# Patient Record
Sex: MALE | Race: WHITE | NOT HISPANIC OR LATINO | Employment: FULL TIME | URBAN - NONMETROPOLITAN AREA
[De-identification: names, ages, dates, MRNs, and addresses within clinical notes are randomized per-mention and may not be internally consistent; named-entity substitution may affect disease eponyms.]

---

## 2019-01-10 ENCOUNTER — OFFICE VISIT (OUTPATIENT)
Dept: FAMILY MEDICINE CLINIC | Facility: CLINIC | Age: 49
End: 2019-01-10

## 2019-01-10 ENCOUNTER — RESULTS ENCOUNTER (OUTPATIENT)
Dept: FAMILY MEDICINE CLINIC | Facility: CLINIC | Age: 49
End: 2019-01-10

## 2019-01-10 VITALS
BODY MASS INDEX: 28.36 KG/M2 | HEART RATE: 86 BPM | TEMPERATURE: 98.4 F | SYSTOLIC BLOOD PRESSURE: 126 MMHG | HEIGHT: 74 IN | WEIGHT: 221 LBS | DIASTOLIC BLOOD PRESSURE: 83 MMHG | OXYGEN SATURATION: 99 %

## 2019-01-10 DIAGNOSIS — R53.83 OTHER FATIGUE: ICD-10-CM

## 2019-01-10 DIAGNOSIS — Z12.5 ENCOUNTER FOR SCREENING FOR MALIGNANT NEOPLASM OF PROSTATE: ICD-10-CM

## 2019-01-10 DIAGNOSIS — Z87.898 HISTORY OF ULCER DISEASE: ICD-10-CM

## 2019-01-10 DIAGNOSIS — R53.83 OTHER FATIGUE: Primary | ICD-10-CM

## 2019-01-10 DIAGNOSIS — J30.89 NON-SEASONAL ALLERGIC RHINITIS DUE TO OTHER ALLERGIC TRIGGER: ICD-10-CM

## 2019-01-10 PROCEDURE — 99204 OFFICE O/P NEW MOD 45 MIN: CPT | Performed by: FAMILY MEDICINE

## 2019-01-10 RX ORDER — MONTELUKAST SODIUM 10 MG/1
10 TABLET ORAL NIGHTLY
Qty: 30 TABLET | Refills: 5 | Status: SHIPPED | OUTPATIENT
Start: 2019-01-10 | End: 2020-08-21

## 2019-01-10 RX ORDER — PANTOPRAZOLE SODIUM 40 MG/1
20 TABLET, DELAYED RELEASE ORAL DAILY
Refills: 5 | COMMUNITY
Start: 2018-11-27 | End: 2020-08-10 | Stop reason: SDUPTHER

## 2019-01-10 NOTE — PATIENT INSTRUCTIONS
Try the singulair.  Use your neti pot.    May have to do something about your back fracture.  May have something to do about your allergies.

## 2019-01-29 NOTE — PROGRESS NOTES
"Jovany Infante     VITALS: Blood pressure 126/83, pulse 86, temperature 98.4 °F (36.9 °C), temperature source Oral, height 188 cm (74\"), weight 100 kg (221 lb), SpO2 99 %.    Subjective  Chief Complaint:   Chief Complaint   Patient presents with   • Headache        History of Present Illness:  Patient is a 48 y.o.  male with a medical history significant for allergic rhinitis who presents to clinic secondary to establishment of care. He states that he is overly fatigued. He is also concerned because he has been having some recent epigastric pain, sinus drainage, and sinus headaches.     Patient has GERD and is currently on protonix 20 mg orally daily. Patient denies any side effects of the medication. Denies metallic tastes and has not been having increased symptoms during sleep.Has not had any recent exacerbations. Denies any nausea, vomiting, burping, hiccuping, or midepigastric pain.    Patient has allergic rhinitis and is currently on zyrtec 10 mg orally daily. Denies any side effects of the medication. Denies any sinus congestion, sinus headaches, watery eyes, itchy eyes, rhinorrhea, coughing, or postnasal discharge.   Allergy injections:  No    The following portions of the patient's history were reviewed and updated as appropriate: allergies, current medications, past family history, past medical history, past social history, past surgical history and problem list.    Past Medical History  Past Medical History:   Diagnosis Date   • Allergic rhinitis    • Back pain    • BPH (benign prostatic hyperplasia)    • GERD (gastroesophageal reflux disease)    • Heart murmur    • History of ulcer disease        Review of Systems  Constitutional: Denies any recent history of HAs, dizziness, fevers, chills, itching.  Eyes: Denies any changes in vision. Denies any blurry vision or diplopia.  Ears, Nose, Mouth, Throat: Denies any sore throat, rhinorrhea, or cough.  Cardiovascular: Denies any chest pain, pressure, or " palpitations.  Respiratory: Denies any shortness of breath or wheezing.  Gastrointestinal: Denies any  nausea, vomiting, diarrhea, or constipation.  Genitourinary: Denies any changes in urination.  Musculoskeletal: Denies any muscle weakness.  Skin and/or breasts: Denies any rashes.  Neurological: Denies any changes in balance or gait.  Psychiatric: Denies any suicidal or homicidal ideations.  Endocrine: Denies any heat or cold intolerance. Denies any voice changes, polydipsia, or polyuria.  Hematologic/Lymphatic: Denies any anemia or easy bruising.    Surgical History  Past Surgical History:   Procedure Laterality Date   • CARPAL TUNNEL RELEASE Right        Family History  Family History   Problem Relation Age of Onset   • No Known Problems Mother    • Hypertension Father    • Alcohol abuse Father    • No Known Problems Brother        Social History  Social History     Socioeconomic History   • Marital status:      Spouse name: Not on file   • Number of children: Not on file   • Years of education: Not on file   • Highest education level: Not on file   Social Needs   • Financial resource strain: Not on file   • Food insecurity - worry: Not on file   • Food insecurity - inability: Not on file   • Transportation needs - medical: Not on file   • Transportation needs - non-medical: Not on file   Occupational History   • Not on file   Tobacco Use   • Smoking status: Never Smoker   • Smokeless tobacco: Current User     Types: Chew   Substance and Sexual Activity   • Alcohol use: No     Frequency: Never   • Drug use: No   • Sexual activity: Defer   Other Topics Concern   • Not on file   Social History Narrative   • Not on file       Objective  Physical Exam  Gen: Patient in NAD. Pleasant and answers appropriately. A&Ox3.    Skin: Warm and dry with normal turgor. No purpura, rashes, or unusual pigmentation noted. Hair is normal in appearance and distribution.    HEENT: NC/AT. No lesions noted. Conjunctiva clear,  sclera nonicteric. PERRL. EOMI without nystagmus or strabismus. Fundi appear benign. No hemorrhages or exudates of eyes. Auditory canals are patent bilaterally without lesions. TMs intact, erythematous, nonbulging without lesions. Nasal mucosa erythematous, and nonedematous. No nasal polyps or other lesions noted. Frontal and  maxillary sinuses are nontender. O/P nonerythematous and moist without exudate.    Neck: Supple without lymph nodes palpated. FROM. No evidence of tracheal deviation or thyromegaly. Carotid pulses 2+/4 B/L without bruits.     Lungs: CTA B/L without rales, rhonchi, crackles, or wheezes.    Heart: RRR. S1 and S2 normal. No S3 or S4. No MRGT.    Abd: Soft, nontender,nondistended. (+)BSx4 quadrants. No scars or abnormalities noted. No HSM, masses, or bruits noted.    Extrem: No CCE. Radial pulses 2+/4 and equal B/L. FROMx4. No bone, joint, or muscle tenderness noted.    Neuro: No focal motor/sensory deficits.    Procedures      Assessment/Plan  Jovany EULALIO KoInfante is a 48 y.o. here for establishment of care.  Diagnoses and all orders for this visit:    Other fatigue  -     CBC & Differential; Future  -     Comprehensive Metabolic Panel; Future  -     Vitamin B12; Future  -     TSH; Future  -     Sedimentation Rate; Future  -     C-reactive Protein; Future  -     Magnesium; Future    Encounter for screening for malignant neoplasm of prostate  -     PSA Screen; Future    History of ulcer disease  -     H. Pylori IgM, Blood; Future    Non-seasonal allergic rhinitis due to other allergic trigger  -     montelukast (SINGULAIR) 10 MG tablet; Take 1 tablet by mouth Every Night.      Findings and plans discussed with patient who verbalizes understanding and agreement. Will followup with patient once results are in. Patient to  followup at clinic PRN or in one month for medical followup.    Patient's Body mass index is 28.37 kg/m². BMI is above normal parameters. Recommendations include: exercise counseling  and nutrition counseling.      Kathie Pink MD

## 2019-02-06 ENCOUNTER — APPOINTMENT (OUTPATIENT)
Dept: LAB | Facility: HOSPITAL | Age: 49
End: 2019-02-06

## 2019-02-06 LAB
ALBUMIN SERPL-MCNC: 4.7 G/DL (ref 3.5–5)
ALBUMIN/GLOB SERPL: 1.7 G/DL (ref 1.5–2.5)
ALP SERPL-CCNC: 68 U/L (ref 40–129)
ALT SERPL W P-5'-P-CCNC: 23 U/L (ref 10–44)
ANION GAP SERPL CALCULATED.3IONS-SCNC: 6.7 MMOL/L (ref 3.6–11.2)
AST SERPL-CCNC: 26 U/L (ref 10–34)
BASOPHILS # BLD AUTO: 0.02 10*3/MM3 (ref 0–0.3)
BASOPHILS NFR BLD AUTO: 0.2 % (ref 0–2)
BILIRUB SERPL-MCNC: 0.8 MG/DL (ref 0.2–1.8)
BUN BLD-MCNC: 17 MG/DL (ref 7–21)
BUN/CREAT SERPL: 14.9 (ref 7–25)
CALCIUM SPEC-SCNC: 9.5 MG/DL (ref 7.7–10)
CHLORIDE SERPL-SCNC: 106 MMOL/L (ref 99–112)
CO2 SERPL-SCNC: 26.3 MMOL/L (ref 24.3–31.9)
CREAT BLD-MCNC: 1.14 MG/DL (ref 0.43–1.29)
CRP SERPL-MCNC: <0.5 MG/DL (ref 0–0.99)
DEPRECATED RDW RBC AUTO: 41.6 FL (ref 37–54)
EOSINOPHIL # BLD AUTO: 0.22 10*3/MM3 (ref 0–0.7)
EOSINOPHIL NFR BLD AUTO: 2.2 % (ref 0–5)
ERYTHROCYTE [DISTWIDTH] IN BLOOD BY AUTOMATED COUNT: 13.1 % (ref 11.5–14.5)
ERYTHROCYTE [SEDIMENTATION RATE] IN BLOOD: 5 MM/HR (ref 0–15)
GFR SERPL CREATININE-BSD FRML MDRD: 68 ML/MIN/1.73
GLOBULIN UR ELPH-MCNC: 2.8 GM/DL
GLUCOSE BLD-MCNC: 100 MG/DL (ref 70–110)
HCT VFR BLD AUTO: 45.9 % (ref 42–52)
HGB BLD-MCNC: 15.8 G/DL (ref 14–18)
IMM GRANULOCYTES # BLD AUTO: 0.02 10*3/MM3 (ref 0–0.03)
IMM GRANULOCYTES NFR BLD AUTO: 0.2 % (ref 0–0.5)
LYMPHOCYTES # BLD AUTO: 2.56 10*3/MM3 (ref 1–3)
LYMPHOCYTES NFR BLD AUTO: 25.5 % (ref 21–51)
MAGNESIUM SERPL-MCNC: 2.3 MG/DL (ref 1.7–2.6)
MCH RBC QN AUTO: 30 PG (ref 27–33)
MCHC RBC AUTO-ENTMCNC: 34.4 G/DL (ref 33–37)
MCV RBC AUTO: 87.3 FL (ref 80–94)
MONOCYTES # BLD AUTO: 0.71 10*3/MM3 (ref 0.1–0.9)
MONOCYTES NFR BLD AUTO: 7.1 % (ref 0–10)
NEUTROPHILS # BLD AUTO: 6.49 10*3/MM3 (ref 1.4–6.5)
NEUTROPHILS NFR BLD AUTO: 64.8 % (ref 30–70)
OSMOLALITY SERPL CALC.SUM OF ELEC: 279.2 MOSM/KG (ref 273–305)
PLATELET # BLD AUTO: 237 10*3/MM3 (ref 130–400)
PMV BLD AUTO: 10.6 FL (ref 6–10)
POTASSIUM BLD-SCNC: 4.3 MMOL/L (ref 3.5–5.3)
PROT SERPL-MCNC: 7.5 G/DL (ref 6–8)
PSA SERPL-MCNC: 0.91 NG/ML (ref 0–4)
RBC # BLD AUTO: 5.26 10*6/MM3 (ref 4.7–6.1)
SODIUM BLD-SCNC: 139 MMOL/L (ref 135–153)
TSH SERPL DL<=0.05 MIU/L-ACNC: 3.35 MIU/ML (ref 0.55–4.78)
VIT B12 BLD-MCNC: 366 PG/ML (ref 211–911)
WBC NRBC COR # BLD: 10.02 10*3/MM3 (ref 4.5–12.5)

## 2019-02-06 PROCEDURE — 86677 HELICOBACTER PYLORI ANTIBODY: CPT | Performed by: FAMILY MEDICINE

## 2019-02-06 PROCEDURE — 36415 COLL VENOUS BLD VENIPUNCTURE: CPT | Performed by: FAMILY MEDICINE

## 2019-02-06 PROCEDURE — 82607 VITAMIN B-12: CPT | Performed by: FAMILY MEDICINE

## 2019-02-06 PROCEDURE — 80053 COMPREHEN METABOLIC PANEL: CPT | Performed by: FAMILY MEDICINE

## 2019-02-06 PROCEDURE — 85025 COMPLETE CBC W/AUTO DIFF WBC: CPT | Performed by: FAMILY MEDICINE

## 2019-02-06 PROCEDURE — 85652 RBC SED RATE AUTOMATED: CPT | Performed by: FAMILY MEDICINE

## 2019-02-06 PROCEDURE — 84443 ASSAY THYROID STIM HORMONE: CPT | Performed by: FAMILY MEDICINE

## 2019-02-06 PROCEDURE — 86140 C-REACTIVE PROTEIN: CPT | Performed by: FAMILY MEDICINE

## 2019-02-06 PROCEDURE — G0103 PSA SCREENING: HCPCS | Performed by: FAMILY MEDICINE

## 2019-02-06 PROCEDURE — 83735 ASSAY OF MAGNESIUM: CPT | Performed by: FAMILY MEDICINE

## 2019-02-07 ENCOUNTER — OFFICE VISIT (OUTPATIENT)
Dept: FAMILY MEDICINE CLINIC | Facility: CLINIC | Age: 49
End: 2019-02-07

## 2019-02-07 VITALS
WEIGHT: 218 LBS | HEIGHT: 74 IN | TEMPERATURE: 98.6 F | DIASTOLIC BLOOD PRESSURE: 80 MMHG | SYSTOLIC BLOOD PRESSURE: 133 MMHG | OXYGEN SATURATION: 99 % | BODY MASS INDEX: 27.98 KG/M2 | HEART RATE: 92 BPM

## 2019-02-07 DIAGNOSIS — Z87.898 HISTORY OF ULCER DISEASE: ICD-10-CM

## 2019-02-07 DIAGNOSIS — J30.89 NON-SEASONAL ALLERGIC RHINITIS DUE TO OTHER ALLERGIC TRIGGER: ICD-10-CM

## 2019-02-07 DIAGNOSIS — R53.83 OTHER FATIGUE: Primary | ICD-10-CM

## 2019-02-07 DIAGNOSIS — Z12.11 ENCOUNTER FOR SCREENING FOR MALIGNANT NEOPLASM OF COLON: ICD-10-CM

## 2019-02-07 PROCEDURE — 99214 OFFICE O/P EST MOD 30 MIN: CPT | Performed by: FAMILY MEDICINE

## 2019-02-08 LAB — H PYLORI IGM SER-ACNC: <9 UNITS (ref 0–8.9)

## 2019-02-26 NOTE — PROGRESS NOTES
"Jovany Infante     VITALS: Blood pressure 133/80, pulse 92, temperature 98.6 °F (37 °C), temperature source Oral, height 188 cm (74.02\"), weight 98.9 kg (218 lb), SpO2 99 %.    Subjective  Chief Complaint:   Chief Complaint   Patient presents with   • Follow-up   • Fatigue        History of Present Illness:  Patient is a 48 y.o.  male with a medical history significant for allergic rhinitis who presents to clinic secondary to medical followup.    Patient has GERD and is currently on protonix 20 mg orally daily. Patient denies any side effects of the medication. Denies metallic tastes and has not been having increased symptoms during sleep.Has not had any recent exacerbations. Denies any nausea, vomiting, burping, hiccuping, or midepigastric pain.    Patient has allergic rhinitis and is currently on zyrtec 10 mg orally daily. Denies any side effects of the medication. Denies any sinus congestion, sinus headaches, watery eyes, itchy eyes, rhinorrhea, coughing, or postnasal discharge.   Allergy injections:  No  No complaints about any of the medications.    The following portions of the patient's history were reviewed and updated as appropriate: allergies, current medications, past family history, past medical history, past social history, past surgical history and problem list.    Past Medical History  Past Medical History:   Diagnosis Date   • Allergic rhinitis    • Back pain    • BPH (benign prostatic hyperplasia)    • GERD (gastroesophageal reflux disease)    • Heart murmur    • History of ulcer disease        Review of Systems  Constitutional: Denies any recent history of HAs, dizziness, fevers, chills, itching.  Eyes: Denies any changes in vision. Denies any blurry vision or diplopia.  Ears, Nose, Mouth, Throat: Denies any sore throat, rhinorrhea, or cough.  Cardiovascular: Denies any chest pain, pressure, or palpitations.  Respiratory: Denies any shortness of breath or wheezing.  Gastrointestinal: Denies any " abdominal pain, nausea, vomiting, diarrhea, or constipation.  Genitourinary: Denies any changes in urination.  Musculoskeletal: Denies any muscle weakness.  Skin and/or breasts: Denies any rashes.  Neurological: Denies any changes in balance or gait.  Psychiatric: Denies any anxiety, depression, or insomnia. Denies any suicidal or homicidal ideations.  Endocrine: Denies any heat or cold intolerance. Denies any voice changes, polydipsia, or polyuria.  Hematologic/Lymphatic: Denies any anemia or easy bruising.    Surgical History  Past Surgical History:   Procedure Laterality Date   • CARPAL TUNNEL RELEASE Right        Family History  Family History   Problem Relation Age of Onset   • No Known Problems Mother    • Hypertension Father    • Alcohol abuse Father    • No Known Problems Brother        Social History  Social History     Socioeconomic History   • Marital status:      Spouse name: Not on file   • Number of children: Not on file   • Years of education: Not on file   • Highest education level: Not on file   Social Needs   • Financial resource strain: Not on file   • Food insecurity - worry: Not on file   • Food insecurity - inability: Not on file   • Transportation needs - medical: Not on file   • Transportation needs - non-medical: Not on file   Occupational History   • Not on file   Tobacco Use   • Smoking status: Never Smoker   • Smokeless tobacco: Current User     Types: Chew   Substance and Sexual Activity   • Alcohol use: No     Frequency: Never   • Drug use: No   • Sexual activity: Defer   Other Topics Concern   • Not on file   Social History Narrative   • Not on file       Objective  Physical Exam  Gen: Patient in NAD. Pleasant and answers appropriately. A&Ox3.    Skin: Warm and dry with normal turgor. No purpura, rashes, or unusual pigmentation noted. Hair is normal in appearance and distribution.    HEENT: NC/AT. No lesions noted. Conjunctiva clear, sclera nonicteric. PERRL. EOMI without  nystagmus or strabismus. Fundi appear benign. No hemorrhages or exudates of eyes. Auditory canals are patent bilaterally without lesions. TMs intact,  nonerythematous, nonbulging without lesions. Nasal mucosa pink, nonerythematous, and nonedematous. Frontal and maxillary sinuses are nontender. O/P nonerythematous and moist without exudate.    Neck: Supple without lymph nodes palpated. FROM.     Lungs: CTA B/L without rales, rhonchi, crackles, or wheezes.    Heart: RRR. S1 and S2 normal. No S3 or S4. No MRGT.    Abd: Soft, nontender,nondistended. (+)BSx4 quadrants.     Extrem: No CCE. Radial pulses 2+/4 and equal B/L. FROMx4. No bone, joint, or muscle tenderness noted.    Neuro: No focal motor/sensory deficits.    Procedures    Assessment/Plan  Jovany Infante is a 48 y.o. here for medical followup.  Diagnoses and all orders for this visit:    Other fatigue  -     Ambulatory Referral to Sleep Medicine    Non-seasonal allergic rhinitis due to other allergic trigger    History of ulcer disease    Encounter for screening of colon neoplasm  Will do Cologuard. Forms filled out.    Declines flu.    Patient's Body mass index is 27.98 kg/m². BMI is above normal parameters. Recommendations include: exercise counseling and nutrition counseling.     Findings and plans discussed with patient who verbalizes understanding and agreement. Will followup with patient once results are in. Patient to followup at clinic PRN or in three months for further medical followup.    Kathie Pink MD

## 2019-05-07 LAB — H PYLORI IGM SER-ACNC: NORMAL

## 2019-10-17 ENCOUNTER — TELEPHONE (OUTPATIENT)
Dept: FAMILY MEDICINE CLINIC | Facility: CLINIC | Age: 49
End: 2019-10-17

## 2019-10-17 ENCOUNTER — OFFICE VISIT (OUTPATIENT)
Dept: FAMILY MEDICINE CLINIC | Facility: CLINIC | Age: 49
End: 2019-10-17

## 2019-10-17 VITALS
BODY MASS INDEX: 26.69 KG/M2 | OXYGEN SATURATION: 99 % | WEIGHT: 208 LBS | SYSTOLIC BLOOD PRESSURE: 111 MMHG | TEMPERATURE: 98.3 F | HEART RATE: 87 BPM | DIASTOLIC BLOOD PRESSURE: 82 MMHG | HEIGHT: 74 IN

## 2019-10-17 DIAGNOSIS — R63.4 WEIGHT LOSS: ICD-10-CM

## 2019-10-17 DIAGNOSIS — Z23 IMMUNIZATION DUE: ICD-10-CM

## 2019-10-17 DIAGNOSIS — R53.83 OTHER FATIGUE: Primary | ICD-10-CM

## 2019-10-17 DIAGNOSIS — J30.89 NON-SEASONAL ALLERGIC RHINITIS DUE TO OTHER ALLERGIC TRIGGER: ICD-10-CM

## 2019-10-17 PROCEDURE — 90632 HEPA VACCINE ADULT IM: CPT | Performed by: FAMILY MEDICINE

## 2019-10-17 PROCEDURE — 99214 OFFICE O/P EST MOD 30 MIN: CPT | Performed by: FAMILY MEDICINE

## 2019-10-17 PROCEDURE — 90471 IMMUNIZATION ADMIN: CPT | Performed by: FAMILY MEDICINE

## 2019-10-17 RX ORDER — LEVOCETIRIZINE DIHYDROCHLORIDE 5 MG/1
5 TABLET, FILM COATED ORAL EVERY EVENING
Qty: 30 TABLET | Refills: 2 | Status: SHIPPED | OUTPATIENT
Start: 2019-10-17 | End: 2020-08-21

## 2019-10-17 RX ORDER — PSEUDOEPHEDRINE HCL 120 MG/1
120 TABLET, FILM COATED, EXTENDED RELEASE ORAL EVERY 12 HOURS
Qty: 60 TABLET | Refills: 2 | Status: SHIPPED | OUTPATIENT
Start: 2019-10-17 | End: 2019-12-09

## 2019-10-17 NOTE — PATIENT INSTRUCTIONS
Neti pot. Continue singulair.  Stop the zyrtec. Try xyzal.  Add sudafed.    Go to the chiropractor.  Do your cologuard.  Find out what cancer uncle had.

## 2019-10-18 ENCOUNTER — CLINICAL SUPPORT (OUTPATIENT)
Dept: FAMILY MEDICINE CLINIC | Facility: CLINIC | Age: 49
End: 2019-10-18

## 2019-10-18 DIAGNOSIS — E53.8 VITAMIN B12 DEFICIENCY: Primary | ICD-10-CM

## 2019-10-18 LAB
25(OH)D3+25(OH)D2 SERPL-MCNC: 27.2 NG/ML (ref 30–100)
ALBUMIN SERPL-MCNC: 4.9 G/DL (ref 3.5–5.2)
ALBUMIN/GLOB SERPL: 2.3 G/DL
ALP SERPL-CCNC: 65 U/L (ref 39–117)
ALT SERPL-CCNC: 17 U/L (ref 1–41)
AST SERPL-CCNC: 16 U/L (ref 1–40)
BASOPHILS # BLD AUTO: 0.03 10*3/MM3 (ref 0–0.2)
BASOPHILS NFR BLD AUTO: 0.3 % (ref 0–1.5)
BILIRUB SERPL-MCNC: 0.5 MG/DL (ref 0.2–1.2)
BUN SERPL-MCNC: 13 MG/DL (ref 6–20)
BUN/CREAT SERPL: 12.4 (ref 7–25)
CALCIUM SERPL-MCNC: 9.4 MG/DL (ref 8.6–10.5)
CHLORIDE SERPL-SCNC: 104 MMOL/L (ref 98–107)
CO2 SERPL-SCNC: 26 MMOL/L (ref 22–29)
CREAT SERPL-MCNC: 1.05 MG/DL (ref 0.76–1.27)
CRP SERPL-MCNC: 0.28 MG/DL (ref 0–0.5)
EOSINOPHIL # BLD AUTO: 0.23 10*3/MM3 (ref 0–0.4)
EOSINOPHIL NFR BLD AUTO: 2.6 % (ref 0.3–6.2)
ERYTHROCYTE [DISTWIDTH] IN BLOOD BY AUTOMATED COUNT: 12.9 % (ref 12.3–15.4)
ERYTHROCYTE [SEDIMENTATION RATE] IN BLOOD BY WESTERGREN METHOD: 3 MM/HR (ref 0–20)
GLOBULIN SER CALC-MCNC: 2.1 GM/DL
GLUCOSE SERPL-MCNC: 93 MG/DL (ref 65–99)
HCT VFR BLD AUTO: 46.1 % (ref 37.5–51)
HGB BLD-MCNC: 15.4 G/DL (ref 13–17.7)
IMM GRANULOCYTES # BLD AUTO: 0.02 10*3/MM3 (ref 0–0.05)
IMM GRANULOCYTES NFR BLD AUTO: 0.2 % (ref 0–0.5)
LYMPHOCYTES # BLD AUTO: 2.36 10*3/MM3 (ref 0.7–3.1)
LYMPHOCYTES NFR BLD AUTO: 27 % (ref 19.6–45.3)
MAGNESIUM SERPL-MCNC: 2.3 MG/DL (ref 1.6–2.6)
MCH RBC QN AUTO: 29.8 PG (ref 26.6–33)
MCHC RBC AUTO-ENTMCNC: 33.4 G/DL (ref 31.5–35.7)
MCV RBC AUTO: 89.3 FL (ref 79–97)
MONOCYTES # BLD AUTO: 0.85 10*3/MM3 (ref 0.1–0.9)
MONOCYTES NFR BLD AUTO: 9.7 % (ref 5–12)
NEUTROPHILS # BLD AUTO: 5.26 10*3/MM3 (ref 1.7–7)
NEUTROPHILS NFR BLD AUTO: 60.2 % (ref 42.7–76)
NRBC BLD AUTO-RTO: 0 /100 WBC (ref 0–0.2)
PLATELET # BLD AUTO: 244 10*3/MM3 (ref 140–450)
POTASSIUM SERPL-SCNC: 4.7 MMOL/L (ref 3.5–5.2)
PROT SERPL-MCNC: 7 G/DL (ref 6–8.5)
RBC # BLD AUTO: 5.16 10*6/MM3 (ref 4.14–5.8)
SODIUM SERPL-SCNC: 141 MMOL/L (ref 136–145)
TSH SERPL DL<=0.005 MIU/L-ACNC: 2.19 UIU/ML (ref 0.27–4.2)
VIT B12 SERPL-MCNC: 264 PG/ML (ref 211–946)
WBC # BLD AUTO: 8.75 10*3/MM3 (ref 3.4–10.8)

## 2019-10-18 PROCEDURE — 96372 THER/PROPH/DIAG INJ SC/IM: CPT | Performed by: FAMILY MEDICINE

## 2019-10-18 RX ORDER — CYANOCOBALAMIN 1000 UG/ML
1000 INJECTION, SOLUTION INTRAMUSCULAR; SUBCUTANEOUS ONCE
Status: COMPLETED | OUTPATIENT
Start: 2019-10-18 | End: 2019-10-18

## 2019-10-18 RX ADMIN — CYANOCOBALAMIN 1000 MCG: 1000 INJECTION, SOLUTION INTRAMUSCULAR; SUBCUTANEOUS at 16:58

## 2019-10-31 NOTE — PROGRESS NOTES
"Jovany Infante     VITALS: Blood pressure 111/82, pulse 87, temperature 98.3 °F (36.8 °C), temperature source Oral, height 188 cm (74.02\"), weight 94.3 kg (208 lb), SpO2 99 %.    Subjective  Chief Complaint:   Chief Complaint   Patient presents with   • Weight Loss   • Fatigue   • Pain     right leg         History of Present Illness:  Patient is a 49 y.o.  male with a medical history significant for GERD and allergic rhinitis who presents to clinic secondary to medical followup.  He comes with his wife.  He is worried today because for the last 10 months, he has lost about 12 pounds unintentionally.  He also complains of a lot of fatigue.  He states that he does not have any energy to do anything.  Patient also states that his right leg hurts during movement.  He states that he has a history of L5 fracture of the back.  He states that his right leg pain starts at the time of his leg and radiates downwards.  It is a dull, throbbing, aching kind of pain.  Rest makes it better.  Movement makes it worse.  Patient also complains of a lot of sinus congestion.    Patient has GERD and is currently on Protonix 20 mg orally daily without any side effects.  He denies any metallic taste and has not been having any increased symptoms during sleep.  He has not had any recent exacerbations.  He denies any nausea, vomiting, burping, hiccuping, or midepigastric pain.    Patient is allergic rhinitis and is currently on Singulair 10 mg orally daily and Zyrtec 10 mg orally daily.  He denies any side effects of the medications.  He is having exacerbations.  No complaints about any of the medications.    The following portions of the patient's history were reviewed and updated as appropriate: allergies, current medications, past family history, past medical history, past social history, past surgical history and problem list.    Past Medical History  Past Medical History:   Diagnosis Date   • Allergic rhinitis    • Back pain    • " BPH (benign prostatic hyperplasia)    • GERD (gastroesophageal reflux disease)    • Heart murmur    • History of ulcer disease        Review of Systems   Constitutional: Positive for fatigue and unexpected weight change. Negative for chills and fever.   HENT: Positive for congestion, sinus pressure and sinus pain.    Respiratory: Negative for shortness of breath and wheezing.    Cardiovascular: Negative for chest pain and palpitations.   Musculoskeletal: Positive for back pain, gait problem, joint swelling and myalgias.       Surgical History  Past Surgical History:   Procedure Laterality Date   • CARPAL TUNNEL RELEASE Right        Family History  Family History   Problem Relation Age of Onset   • No Known Problems Mother    • Hypertension Father    • Alcohol abuse Father    • No Known Problems Brother        Social History  Social History     Socioeconomic History   • Marital status:      Spouse name: Not on file   • Number of children: Not on file   • Years of education: Not on file   • Highest education level: Not on file   Tobacco Use   • Smoking status: Never Smoker   • Smokeless tobacco: Current User     Types: Chew   Substance and Sexual Activity   • Alcohol use: No     Frequency: Never   • Drug use: No   • Sexual activity: Defer       Objective  Physical Exam    Gen: Patient in NAD. Pleasant and answers appropriately. A&Ox3.    Skin: Warm and dry with normal turgor. No purpura, rashes, or unusual pigmentation noted. Hair is normal in appearance and distribution.    HEENT: NC/AT. No lesions noted. Conjunctiva clear, sclera nonicteric. PERRL. EOMI without nystagmus or strabismus. Fundi appear benign. No hemorrhages or exudates of eyes. Auditory canals are patent bilaterally without lesions. TMs intact,  nonerythematous, bulging without lesions. Nasal mucosa erythematous, and edematous. Frontal and maxillary sinuses are nontender. O/P erythematous and moist without exudate.    Neck: Supple without lymph  nodes palpated. FROM.     Lungs: CTA B/L without rales, rhonchi, crackles, or wheezes.    Heart: RRR. S1 and S2 normal. No S3 or S4. No MRGT.    Abd: Soft, nontender,nondistended. (+)BSx4 quadrants.     Extrem: No CCE. Radial pulses 2+/4 and equal B/L. FROMx4.  Right leg: No tenderness to palpation appreciated.  Benign range of movement.  Appropriate external and internal rotation.  Appropriate abduction and adduction.    Neuro: No focal motor/sensory deficits.    Procedures    Assessment/Plan  Jovany Infante is a 49 y.o. here for medical followup.  Diagnoses and all orders for this visit:    Other fatigue  -     CBC & Differential  -     Comprehensive Metabolic Panel  -     TSH  -     Vitamin D 25 Hydroxy  -     Vitamin B12  -     Magnesium  -     Sedimentation Rate  -     C-reactive Protein    Weight loss  -     CBC & Differential  -     Comprehensive Metabolic Panel  -     TSH  -     Vitamin D 25 Hydroxy  -     Vitamin B12  -     Magnesium  -     Sedimentation Rate  -     C-reactive Protein    Non-seasonal allergic rhinitis due to other allergic trigger  -     pseudoephedrine (SUDAFED) 120 MG 12 hr tablet; Take 1 tablet by mouth Every 12 (Twelve) Hours.  -     levocetirizine (XYZAL) 5 MG tablet; Take 1 tablet by mouth Every Evening.  We will add Sudafed to regimen.  Will discontinue cetirizine and try Xyzal.  Patient to try Neti pot.      Immunization due  -     Hepatitis A Vaccine Adult IM      Patient's Body mass index is 26.69 kg/m². BMI is above normal parameters. Recommendations include: exercise counseling and nutrition counseling.     Findings and plans discussed with patient who verbalizes understanding and agreement. Will followup with patient once results are in. Patient to followup at clinic PRN or in one month for further medical followup.    Kathie Pink MD    EMR Dragon/Transcription Disclaimer:  Much of this encounter note is an electronic transcription/translation of spoken language to  printed text.  The electronic translation of spoken language may permit erroneous, or at times, nonsensical words or phrases to be inadvertently transcribed.  Although I have reviewed the note for such errors, some may still exist.

## 2019-11-20 ENCOUNTER — OFFICE VISIT (OUTPATIENT)
Dept: FAMILY MEDICINE CLINIC | Facility: CLINIC | Age: 49
End: 2019-11-20

## 2019-11-20 VITALS
OXYGEN SATURATION: 100 % | HEIGHT: 74 IN | WEIGHT: 210 LBS | SYSTOLIC BLOOD PRESSURE: 122 MMHG | TEMPERATURE: 97.8 F | HEART RATE: 87 BPM | BODY MASS INDEX: 26.95 KG/M2 | DIASTOLIC BLOOD PRESSURE: 82 MMHG

## 2019-11-20 DIAGNOSIS — R53.83 OTHER FATIGUE: Primary | ICD-10-CM

## 2019-11-20 PROCEDURE — 99214 OFFICE O/P EST MOD 30 MIN: CPT | Performed by: FAMILY MEDICINE

## 2019-12-09 NOTE — PROGRESS NOTES
"Jovany Infante     VITALS: Blood pressure 122/82, pulse 87, temperature 97.8 °F (36.6 °C), temperature source Oral, height 188 cm (74.02\"), weight 95.3 kg (210 lb), SpO2 100 %.    Subjective  Chief Complaint:   Chief Complaint   Patient presents with   • Fatigue   • Syncope        History of Present Illness:  Patient is a 49 y.o.  male with a medical history significant for GERD and allergic rhinitis who presents to clinic secondary to medical followup.  Patient continues to not feel very well.  He has feelings of malaise.  States he occasionally has palpitations.  He states that he is just so tired.  Sleep is not refreshing.  He does snore.  Positive family history of sleep apnea.  He does wake suddenly from sleep.  Last week, he was seen at [facility] secondary for abdominal pain.  Work-up was benign.  He denies any dizziness, blurry vision, shortness of breath, chest pain, or edema.  Lab work-up here has been benign so far.    No complaints about any of the medications.    The following portions of the patient's history were reviewed and updated as appropriate: allergies, current medications, past family history, past medical history, past social history, past surgical history and problem list.    Past Medical History  Past Medical History:   Diagnosis Date   • Allergic rhinitis    • Back pain    • BPH (benign prostatic hyperplasia)    • GERD (gastroesophageal reflux disease)    • Heart murmur    • History of ulcer disease        Review of Systems   Respiratory: Negative for shortness of breath and wheezing.    Cardiovascular: Positive for palpitations. Negative for chest pain.       Surgical History  Past Surgical History:   Procedure Laterality Date   • CARPAL TUNNEL RELEASE Right        Family History  Family History   Problem Relation Age of Onset   • No Known Problems Mother    • Hypertension Father    • Alcohol abuse Father    • No Known Problems Brother        Social History  Social History "     Socioeconomic History   • Marital status:      Spouse name: Not on file   • Number of children: Not on file   • Years of education: Not on file   • Highest education level: Not on file   Tobacco Use   • Smoking status: Never Smoker   • Smokeless tobacco: Current User     Types: Chew   Substance and Sexual Activity   • Alcohol use: No     Frequency: Never   • Drug use: No   • Sexual activity: Defer       Objective  Physical Exam    Gen: Patient in NAD. Pleasant and answers appropriately. A&Ox3.    Skin: Warm and dry with normal turgor. No purpura, rashes, or unusual pigmentation noted. Hair is normal in appearance and distribution.    HEENT: NC/AT. No lesions noted. Conjunctiva clear, sclera nonicteric. PERRL. EOMI without nystagmus or strabismus. Fundi appear benign. No hemorrhages or exudates of eyes. Auditory canals are patent bilaterally without lesions. TMs intact,  nonerythematous, nonbulging without lesions. Nasal mucosa pink, nonerythematous, and nonedematous. Frontal and maxillary sinuses are nontender. O/P nonerythematous and moist without exudate.    Neck: Supple without lymph nodes palpated. FROM.     Lungs: CTA B/L without rales, rhonchi, crackles, or wheezes.    Heart: RRR. S1 and S2 normal. No S3 or S4. No MRGT.    Abd: Soft, nontender,nondistended. (+)BSx4 quadrants.     Extrem: No CCE. Radial pulses 2+/4 and equal B/L. FROMx4. No bone, joint, or muscle tenderness noted.    Neuro: No focal motor/sensory deficits.    Procedures    Assessment/Plan  Jovany Infante is a 49 y.o. here for medical followup.  Diagnoses and all orders for this visit:    Other fatigue  -     Ambulatory Referral to Sleep Medicine    I spent 30 minutes face-to-face with the patient and family, of which 25 minutes were spent counseling on etiologies of fatigue and syncope. Patient is to return to clinic to get an EKG done as the machine was not working correctly today. We discussed a workup of the syncope including  getting old records of his [facility] Holter monitor. Peak flow and orthostatics were WNL here. We discussed what possible diagnoses the fatigue and syncope could be. We discussed risks and benefits of different interventions and alternative therapies.      Patient's Body mass index is 26.95 kg/m². BMI is above normal parameters. Recommendations include: exercise counseling and nutrition counseling.     Findings and plans discussed with patient who verbalizes understanding and agreement. Will followup with patient once results are in. Patient to followup at clinic PRN or in two months for further medical followup.    Kathie Pink MD    EMR Dragon/Transcription Disclaimer:  Much of this encounter note is an electronic transcription/translation of spoken language to printed text.  The electronic translation of spoken language may permit erroneous, or at times, nonsensical words or phrases to be inadvertently transcribed.  Although I have reviewed the note for such errors, some may still exist.

## 2020-01-15 ENCOUNTER — TELEPHONE (OUTPATIENT)
Dept: FAMILY MEDICINE CLINIC | Facility: CLINIC | Age: 50
End: 2020-01-15

## 2020-01-15 NOTE — TELEPHONE ENCOUNTER
No. Let her know that we haven't seen anything. We've called to see if we can get a copy or he may have to come in and sign for it. Please also let her know that our EKG machine is fixed and if he could come in sometime to get that done, I would really love it.

## 2020-01-21 ENCOUNTER — OFFICE VISIT (OUTPATIENT)
Dept: FAMILY MEDICINE CLINIC | Facility: CLINIC | Age: 50
End: 2020-01-21

## 2020-01-21 VITALS
SYSTOLIC BLOOD PRESSURE: 110 MMHG | BODY MASS INDEX: 26.95 KG/M2 | HEIGHT: 74 IN | DIASTOLIC BLOOD PRESSURE: 80 MMHG | OXYGEN SATURATION: 99 % | TEMPERATURE: 98 F | WEIGHT: 210 LBS | HEART RATE: 82 BPM

## 2020-01-21 DIAGNOSIS — J30.89 NON-SEASONAL ALLERGIC RHINITIS DUE TO OTHER ALLERGIC TRIGGER: ICD-10-CM

## 2020-01-21 DIAGNOSIS — E53.8 VITAMIN B12 DEFICIENCY: ICD-10-CM

## 2020-01-21 DIAGNOSIS — G47.33 OSA (OBSTRUCTIVE SLEEP APNEA): Primary | ICD-10-CM

## 2020-01-21 PROCEDURE — 99213 OFFICE O/P EST LOW 20 MIN: CPT | Performed by: FAMILY MEDICINE

## 2020-02-10 NOTE — PROGRESS NOTES
"Jovany Infante     VITALS: Blood pressure 110/80, pulse 82, temperature 98 °F (36.7 °C), temperature source Oral, height 188 cm (74.02\"), weight 95.3 kg (210 lb), SpO2 99 %.    Subjective  Chief Complaint:   Chief Complaint   Patient presents with   • Fatigue        History of Present Illness:  Patient is a 49 y.o.  male none medical history significant for GERD and allergic rhinitis who presents to clinic secondary to medical followup.  No new or acute concerns.  He is feeling better.  Since his last visit, he has been diagnosed with NIKI and is currently starting a CPAP machine.  He states that he feels better.  Also has been diagnosed with vitamin B12 deficiency and is doing B12 shots weekly with good success.  He continues on Singulair and Xyzal for his allergic rhinitis without any side effects.  He denies any fevers, chills, ear pain, congestion, rhinorrhea, coughing, shortness of breath, or chest pain.    No complaints about any of the medications.    The following portions of the patient's history were reviewed and updated as appropriate: allergies, current medications, past family history, past medical history, past social history, past surgical history and problem list.    Past Medical History  Past Medical History:   Diagnosis Date   • Allergic rhinitis    • Back pain    • BPH (benign prostatic hyperplasia)    • GERD (gastroesophageal reflux disease)    • Heart murmur    • History of ulcer disease        Review of Systems   Respiratory: Negative for shortness of breath and wheezing.    Cardiovascular: Negative for chest pain and palpitations.       Surgical History  Past Surgical History:   Procedure Laterality Date   • CARPAL TUNNEL RELEASE Right        Family History  Family History   Problem Relation Age of Onset   • No Known Problems Mother    • Hypertension Father    • Alcohol abuse Father    • No Known Problems Brother        Social History  Social History     Socioeconomic History   • Marital " status:      Spouse name: Not on file   • Number of children: Not on file   • Years of education: Not on file   • Highest education level: Not on file   Tobacco Use   • Smoking status: Never Smoker   • Smokeless tobacco: Current User     Types: Chew   Substance and Sexual Activity   • Alcohol use: No     Frequency: Never   • Drug use: No   • Sexual activity: Defer       Objective  Physical Exam    Gen: Patient in NAD. Pleasant and answers appropriately. A&Ox3.    Skin: Warm and dry with normal turgor. No purpura, rashes, or unusual pigmentation noted. Hair is normal in appearance and distribution.    HEENT: NC/AT. No lesions noted. Conjunctiva clear, sclera nonicteric. PERRL. EOMI without nystagmus or strabismus. Fundi appear benign. No hemorrhages or exudates of eyes. Auditory canals are patent bilaterally without lesions. TMs intact,  nonerythematous, nonbulging without lesions. Nasal mucosa pink, nonerythematous, and nonedematous. Frontal and maxillary sinuses are nontender. O/P nonerythematous and moist without exudate.    Neck: Supple without lymph nodes palpated. FROM.     Lungs: CTA B/L without rales, rhonchi, crackles, or wheezes.    Heart: RRR. S1 and S2 normal. No S3 or S4. No MRGT.    Abd: Soft, nontender,nondistended. (+)BSx4 quadrants.     Extrem: No CCE. Radial pulses 2+/4 and equal B/L. FROMx4. No bone, joint, or muscle tenderness noted.    Neuro: No focal motor/sensory deficits.    Procedures    Assessment/Plan  Jovany Infante is a 49 y.o. here for medical followup.  Diagnoses and all orders for this visit:    NIKI (obstructive sleep apnea)  Continue CPAP machine.  Will monitor.    Vitamin B12 deficiency  B12 shots weekly.    Non-seasonal allergic rhinitis due to other allergic trigger  Stable.  Continue Singulair 10 mg orally daily and Xyzal 5 mg orally daily.    Patient's Body mass index is 26.95 kg/m². BMI is above normal parameters. Recommendations include: exercise counseling and  nutrition counseling.     Findings and plans discussed with patient who verbalizes understanding and agreement. Will followup with patient once results are in. Patient to followup at clinic PRN or in six months for further medical followup.    Kathie Pink MD    EMR Dragon/Transcription Disclaimer:  Much of this encounter note is an electronic transcription/translation of spoken language to printed text.  The electronic translation of spoken language may permit erroneous, or at times, nonsensical words or phrases to be inadvertently transcribed.  Although I have reviewed the note for such errors, some may still exist.

## 2020-03-03 ENCOUNTER — TELEPHONE (OUTPATIENT)
Dept: FAMILY MEDICINE CLINIC | Facility: CLINIC | Age: 50
End: 2020-03-03

## 2020-03-03 NOTE — TELEPHONE ENCOUNTER
Wife called wanting to know if it would be safe with his sleep apnea/C-Pap to take Melatonin for sleep,having problems sleeping.

## 2020-03-03 NOTE — TELEPHONE ENCOUNTER
Yes, it would be fine. Let her know OTC says 3 mg or 5 mg. Can work your way up to 15 mg. Take at night. It doesn't knock you out that way.     Chelsey notified & verbalized understanding.

## 2020-03-03 NOTE — TELEPHONE ENCOUNTER
Yes, it would be fine. Let her know OTC says 3 mg or 5 mg. Can work your way up to 15 mg. Take at night. It doesn't knock you out that way.

## 2020-08-10 ENCOUNTER — OFFICE VISIT (OUTPATIENT)
Dept: FAMILY MEDICINE CLINIC | Facility: CLINIC | Age: 50
End: 2020-08-10

## 2020-08-10 VITALS
BODY MASS INDEX: 26.9 KG/M2 | SYSTOLIC BLOOD PRESSURE: 118 MMHG | TEMPERATURE: 97.2 F | HEIGHT: 74 IN | OXYGEN SATURATION: 98 % | HEART RATE: 87 BPM | WEIGHT: 209.6 LBS | DIASTOLIC BLOOD PRESSURE: 78 MMHG

## 2020-08-10 DIAGNOSIS — G47.33 OSA (OBSTRUCTIVE SLEEP APNEA): ICD-10-CM

## 2020-08-10 DIAGNOSIS — Z87.898 HISTORY OF ULCER DISEASE: ICD-10-CM

## 2020-08-10 DIAGNOSIS — E53.8 VITAMIN B12 DEFICIENCY: ICD-10-CM

## 2020-08-10 DIAGNOSIS — G43.019 INTRACTABLE MIGRAINE WITHOUT AURA AND WITHOUT STATUS MIGRAINOSUS: Primary | ICD-10-CM

## 2020-08-10 DIAGNOSIS — J30.89 NON-SEASONAL ALLERGIC RHINITIS DUE TO OTHER ALLERGIC TRIGGER: ICD-10-CM

## 2020-08-10 PROCEDURE — 96372 THER/PROPH/DIAG INJ SC/IM: CPT | Performed by: FAMILY MEDICINE

## 2020-08-10 PROCEDURE — 99214 OFFICE O/P EST MOD 30 MIN: CPT | Performed by: FAMILY MEDICINE

## 2020-08-10 RX ORDER — CETIRIZINE HYDROCHLORIDE 10 MG/1
10 TABLET ORAL DAILY
Qty: 90 TABLET | Refills: 1 | Status: SHIPPED | OUTPATIENT
Start: 2020-08-10

## 2020-08-10 RX ORDER — KETOROLAC TROMETHAMINE 30 MG/ML
60 INJECTION, SOLUTION INTRAMUSCULAR; INTRAVENOUS ONCE
Status: COMPLETED | OUTPATIENT
Start: 2020-08-10 | End: 2020-08-10

## 2020-08-10 RX ORDER — PANTOPRAZOLE SODIUM 20 MG/1
20 TABLET, DELAYED RELEASE ORAL DAILY
Qty: 90 TABLET | Refills: 1 | Status: SHIPPED | OUTPATIENT
Start: 2020-08-10 | End: 2020-12-14 | Stop reason: SDUPTHER

## 2020-08-10 RX ORDER — PROMETHAZINE HYDROCHLORIDE 25 MG/ML
25 INJECTION, SOLUTION INTRAMUSCULAR; INTRAVENOUS ONCE
Status: COMPLETED | OUTPATIENT
Start: 2020-08-10 | End: 2020-08-10

## 2020-08-10 RX ORDER — RIZATRIPTAN BENZOATE 10 MG/1
10 TABLET ORAL ONCE AS NEEDED
Qty: 30 TABLET | Refills: 2 | Status: SHIPPED | OUTPATIENT
Start: 2020-08-10 | End: 2020-12-14 | Stop reason: SDUPTHER

## 2020-08-10 RX ORDER — CETIRIZINE HYDROCHLORIDE 10 MG/1
10 TABLET ORAL DAILY
COMMUNITY
End: 2020-08-10 | Stop reason: SDUPTHER

## 2020-08-10 RX ADMIN — KETOROLAC TROMETHAMINE 60 MG: 30 INJECTION, SOLUTION INTRAMUSCULAR; INTRAVENOUS at 16:47

## 2020-08-10 RX ADMIN — PROMETHAZINE HYDROCHLORIDE 25 MG: 25 INJECTION, SOLUTION INTRAMUSCULAR; INTRAVENOUS at 16:48

## 2020-08-21 NOTE — PROGRESS NOTES
"Jovany Infante     VITALS: Blood pressure 118/78, pulse 87, temperature 97.2 °F (36.2 °C), height 188 cm (74.02\"), weight 95.1 kg (209 lb 9.6 oz), SpO2 98 %.    Subjective  Chief Complaint:   Chief Complaint   Patient presents with   • Headache        History of Present Illness:  Patient is a 50 y.o.  male with a medical history significant for GERD and allergic rhinitis who presents to clinic secondary to medical followup.  He is complaining of a 4-day history of a headache that will not go away.  He states that he occasionally gets migraines.  He is not sure what brought this went on.  The headache is diffuse throughout the head and is throbbing, achy, and sharp.  He has had a hard time falling asleep secondary to this headache.  Being in a dark room has helped.  He has tried over-the-counter NSAIDs without any alleviation in the headaches.    Patient has chronic conditions of allergic rhinitis, NIKI, B12, and history of ulcer disease.  These chronic conditions are stable and unchanged.  Medications associated with these chronic conditions are not giving him any side effects.  Of note, he has been recently diagnosed with NIKI and has started a CPAP machine.  Although he really liked it in the beginning, he has become more frustrated with the CPAP machine as time has passed.  He states that the mask does not fit correctly on him and he loses more sleep than he gets quality sleep.    No complaints about any of the medications.    The following portions of the patient's history were reviewed and updated as appropriate: allergies, current medications, past family history, past medical history, past social history, past surgical history and problem list.    Past Medical History  Past Medical History:   Diagnosis Date   • Allergic rhinitis    • Back pain    • BPH (benign prostatic hyperplasia)    • GERD (gastroesophageal reflux disease)    • Heart murmur    • History of ulcer disease        Review of Systems "   Respiratory: Negative for shortness of breath and wheezing.    Cardiovascular: Negative for chest pain and palpitations.       Surgical History  Past Surgical History:   Procedure Laterality Date   • CARPAL TUNNEL RELEASE Right        Family History  Family History   Problem Relation Age of Onset   • No Known Problems Mother    • Hypertension Father    • Alcohol abuse Father    • No Known Problems Brother        Social History  Social History     Socioeconomic History   • Marital status:      Spouse name: Not on file   • Number of children: Not on file   • Years of education: Not on file   • Highest education level: Not on file   Tobacco Use   • Smoking status: Never Smoker   • Smokeless tobacco: Current User     Types: Chew   Substance and Sexual Activity   • Alcohol use: No     Frequency: Never   • Drug use: No   • Sexual activity: Defer       Objective  Physical Exam    Gen: Patient in NAD. Pleasant and answers appropriately. A&Ox3.    Skin: Warm and dry with normal turgor. No purpura, rashes, or unusual pigmentation noted. Hair is normal in appearance and distribution.    HEENT: NC/AT. No lesions noted. Conjunctiva clear, sclera nonicteric. PERRL. EOMI without nystagmus or strabismus. Fundi appear benign. No hemorrhages or exudates of eyes. Auditory canals are patent bilaterally without lesions. TMs intact,  nonerythematous, nonbulging without lesions. Nasal mucosa pink, nonerythematous, and nonedematous. Frontal and maxillary sinuses are nontender. O/P nonerythematous and moist without exudate.    Neck: Supple without lymph nodes palpated. FROM.     Lungs: CTA B/L without rales, rhonchi, crackles, or wheezes.    Heart: RRR. S1 and S2 normal. No S3 or S4. No MRGT.    Abd: Soft, nontender,nondistended. (+)BSx4 quadrants.     Extrem: No CCE. Radial pulses 2+/4 and equal B/L. FROMx4.     Neuro: No focal motor/sensory deficits.    Procedures    Assessment/Plan  Jovany Infante is a 50 y.o. here for  medical followup.  Diagnoses and all orders for this visit:    Intractable migraine without aura and without status migrainosus  -     ketorolac (TORADOL) injection 60 mg  -     promethazine (PHENERGAN) injection 25 mg  -     rizatriptan (MAXALT) 10 MG tablet; Take 1 tablet by mouth 1 (One) Time As Needed for Migraine for up to 1 dose. May repeat in 2 hours if needed  Supportive care indicated, including increased fluids and rest. Patient to monitor. Patient to call if symptoms continue or worsen.     Non-seasonal allergic rhinitis due to other allergic trigger  -     cetirizine (zyrTEC) 10 MG tablet; Take 1 tablet by mouth Daily.  Patient to continue singular 10 mg orally daily and Zyrtec 10 mg orally daily.    NIKI (obstructive sleep apnea)  Discussed with patient to call sleep medicine to help him with CPAP adjustments.    Vitamin B12 deficiency  -     Cyanocobalamin 1000 MCG/ML kit; Inject 1 mL as directed 1 (One) Time Per Week.    History of ulcer disease  -     pantoprazole (PROTONIX) 20 MG EC tablet; Take 1 tablet by mouth Daily.  Continue Protonix 20 mg orally daily.    Patient's Body mass index is 26.9 kg/m². BMI is above normal parameters. Recommendations include: exercise counseling and nutrition counseling.     Findings and plans discussed with patient who verbalizes understanding and agreement. Will followup with patient once results are in. Patient to followup at clinic PRN or in 3 months for further medical followup.    Kathie Pink MD    EMR Dragon/Transcription Disclaimer:  Much of this encounter note is an electronic transcription/translation of spoken language to printed text.  The electronic translation of spoken language may permit erroneous, or at times, nonsensical words or phrases to be inadvertently transcribed.  Although I have reviewed the note for such errors, some may still exist.

## 2020-12-14 ENCOUNTER — RESULTS ENCOUNTER (OUTPATIENT)
Dept: FAMILY MEDICINE CLINIC | Facility: CLINIC | Age: 50
End: 2020-12-14

## 2020-12-14 ENCOUNTER — OFFICE VISIT (OUTPATIENT)
Dept: FAMILY MEDICINE CLINIC | Facility: CLINIC | Age: 50
End: 2020-12-14

## 2020-12-14 VITALS
DIASTOLIC BLOOD PRESSURE: 75 MMHG | BODY MASS INDEX: 27.52 KG/M2 | HEART RATE: 98 BPM | SYSTOLIC BLOOD PRESSURE: 120 MMHG | HEIGHT: 74 IN | WEIGHT: 214.4 LBS | TEMPERATURE: 97.7 F | OXYGEN SATURATION: 99 %

## 2020-12-14 DIAGNOSIS — M54.41 CHRONIC BILATERAL LOW BACK PAIN WITH RIGHT-SIDED SCIATICA: ICD-10-CM

## 2020-12-14 DIAGNOSIS — Z13.220 ENCOUNTER FOR LIPID SCREENING FOR CARDIOVASCULAR DISEASE: ICD-10-CM

## 2020-12-14 DIAGNOSIS — G89.29 CHRONIC BILATERAL LOW BACK PAIN WITH RIGHT-SIDED SCIATICA: ICD-10-CM

## 2020-12-14 DIAGNOSIS — Z87.898 HISTORY OF ULCER DISEASE: ICD-10-CM

## 2020-12-14 DIAGNOSIS — M54.9 UPPER BACK PAIN: ICD-10-CM

## 2020-12-14 DIAGNOSIS — Z12.5 ENCOUNTER FOR SCREENING FOR MALIGNANT NEOPLASM OF PROSTATE: ICD-10-CM

## 2020-12-14 DIAGNOSIS — G43.019 INTRACTABLE MIGRAINE WITHOUT AURA AND WITHOUT STATUS MIGRAINOSUS: ICD-10-CM

## 2020-12-14 DIAGNOSIS — Z13.6 ENCOUNTER FOR LIPID SCREENING FOR CARDIOVASCULAR DISEASE: ICD-10-CM

## 2020-12-14 DIAGNOSIS — M54.41 CHRONIC BILATERAL LOW BACK PAIN WITH RIGHT-SIDED SCIATICA: Primary | ICD-10-CM

## 2020-12-14 DIAGNOSIS — G89.29 CHRONIC BILATERAL LOW BACK PAIN WITH RIGHT-SIDED SCIATICA: Primary | ICD-10-CM

## 2020-12-14 PROCEDURE — 99214 OFFICE O/P EST MOD 30 MIN: CPT | Performed by: FAMILY MEDICINE

## 2020-12-14 RX ORDER — MELOXICAM 7.5 MG/1
7.5 TABLET ORAL DAILY
Qty: 30 TABLET | Refills: 1 | Status: SHIPPED | OUTPATIENT
Start: 2020-12-14 | End: 2021-03-22

## 2020-12-14 RX ORDER — RIZATRIPTAN BENZOATE 10 MG/1
10 TABLET ORAL ONCE AS NEEDED
Qty: 30 TABLET | Refills: 2 | Status: SHIPPED | OUTPATIENT
Start: 2020-12-14 | End: 2021-12-29

## 2020-12-14 RX ORDER — CYCLOBENZAPRINE HCL 5 MG
5 TABLET ORAL 2 TIMES DAILY PRN
Qty: 60 TABLET | Refills: 1 | Status: SHIPPED | OUTPATIENT
Start: 2020-12-14 | End: 2021-03-16 | Stop reason: DRUGHIGH

## 2020-12-14 RX ORDER — PANTOPRAZOLE SODIUM 20 MG/1
20 TABLET, DELAYED RELEASE ORAL DAILY
Qty: 90 TABLET | Refills: 1 | Status: SHIPPED | OUTPATIENT
Start: 2020-12-14 | End: 2021-08-23

## 2021-01-04 NOTE — PROGRESS NOTES
"Jovany Infante     VITALS: Blood pressure 120/75, pulse 98, temperature 97.7 °F (36.5 °C), temperature source Temporal, height 188 cm (74\"), weight 97.3 kg (214 lb 6.4 oz), SpO2 99 %.    Subjective  Chief Complaint:   Chief Complaint   Patient presents with   • Migraine   • Back Pain        History of Present Illness:  Patient is a 50 y.o.  male with chronic medical conditions significant for GERD and allergic rhinitis who presents to clinic secondary to medical followup.  Patient is stating that for the last month, his back has been hurting him.  He denies any new trauma or injury.  It has been stressful as well at home.  His entire back has been worsening with pain.  The pain is described as dull, throbbing, and achy.  He denies any radiation of the pain in his upper extremities, but he states that his right leg occasionally has been having some numbness and tingling.  He denies any saddle anesthesia.  He denies any changes in urination or bowel movements.  He has been utilizing over-the-counter NSAIDs without much success.    Patient has chronic conditions including migraines, GERD, and a history of an ulcer.  His chronic conditions are stable and unchanged.  Medications associated with these chronic conditions are not giving him any side effects.    No complaints about any of the medications.    The following portions of the patient's history were reviewed and updated as appropriate: allergies, current medications, past family history, past medical history, past social history, past surgical history and problem list.    Past Medical History  Past Medical History:   Diagnosis Date   • Allergic rhinitis    • Back pain    • BPH (benign prostatic hyperplasia)    • GERD (gastroesophageal reflux disease)    • Heart murmur    • History of ulcer disease        Review of Systems   Respiratory: Negative for shortness of breath and wheezing.    Cardiovascular: Negative for chest pain and palpitations.       Surgical " History  Past Surgical History:   Procedure Laterality Date   • CARPAL TUNNEL RELEASE Right        Family History  Family History   Problem Relation Age of Onset   • No Known Problems Mother    • Hypertension Father    • Alcohol abuse Father    • No Known Problems Brother        Social History  Social History     Socioeconomic History   • Marital status:      Spouse name: Not on file   • Number of children: Not on file   • Years of education: Not on file   • Highest education level: Not on file   Tobacco Use   • Smoking status: Never Smoker   • Smokeless tobacco: Current User     Types: Chew   Substance and Sexual Activity   • Alcohol use: No     Frequency: Never   • Drug use: No   • Sexual activity: Defer       Objective  Physical Exam    Gen: Patient in some distress. Pleasant and answers appropriately. A&Ox3.    Skin: Warm and dry with normal turgor. No purpura, rashes, or unusual pigmentation noted. Hair is normal in appearance and distribution.    HEENT: NC/AT. No lesions noted. Conjunctiva clear, sclera nonicteric. PERRL. EOMI without nystagmus or strabismus. Fundi appear benign. No hemorrhages or exudates of eyes. Auditory canals are patent bilaterally without lesions. TMs intact,  nonerythematous, nonbulging without lesions. Nasal mucosa pink, nonerythematous, and nonedematous. Frontal and maxillary sinuses are nontender. O/P nonerythematous and moist without exudate.    Neck: Supple without lymph nodes palpated. FROM.     Lungs: Slightly decreased B/L without rales, rhonchi, crackles, or wheezes.    Heart: RRR. S1 and S2 normal. No S3 or S4. No MRGT.    Abd: Soft, nontender,nondistended. (+)BSx4 quadrants.     Extrem: No CCE. Radial pulses 2+/4 and equal B/L.  Gets up awkwardly.    Back: Diffusely tender without full range of motion.  Straight leg raise test left benign; right positive.    Neuro: No focal motor/sensory deficits.    Procedures    Assessment/Plan  Jovany Infante is a 50 y.o. here  for medical followup.    Diagnoses and all orders for this visit:    1. Chronic bilateral low back pain with right-sided sciatica (Primary)  -     Comprehensive Metabolic Panel; Future  -     CBC Auto Differential; Future  -     XR Spine Lumbar 2 or 3 View  -     meloxicam (Mobic) 7.5 MG tablet; Take 1 tablet by mouth Daily.  Dispense: 30 tablet; Refill: 1  -     cyclobenzaprine (FLEXERIL) 5 MG tablet; Take 1 tablet by mouth 2 (Two) Times a Day As Needed for Muscle Spasms.  Dispense: 60 tablet; Refill: 1  We will start patient on Mobic 7.5 mg orally daily and Flexeril 5 mg orally twice a day.  Lumbar spine x-ray ordered.  Home exercises given to patient.  He declines physical therapy for now.    2. Upper back pain  -     XR Spine Cervical 2 View  -     meloxicam (Mobic) 7.5 MG tablet; Take 1 tablet by mouth Daily.  Dispense: 30 tablet; Refill: 1  -     cyclobenzaprine (FLEXERIL) 5 MG tablet; Take 1 tablet by mouth 2 (Two) Times a Day As Needed for Muscle Spasms.  Dispense: 60 tablet; Refill: 1  We will start patient on Mobic 7.5 mg orally daily and Flexeril 5 mg orally twice a day.  Cervical spine x-ray ordered.  Home exercises given to patient.  He declines physical therapy for now.    3. History of ulcer disease  -     pantoprazole (PROTONIX) 20 MG EC tablet; Take 1 tablet by mouth Daily.  Dispense: 90 tablet; Refill: 1  -     Comprehensive Metabolic Panel; Future  -     CBC Auto Differential; Future  Refilled Protonix 20 mg orally daily.    4. Intractable migraine without aura and without status migrainosus  -     rizatriptan (MAXALT) 10 MG tablet; Take 1 tablet by mouth 1 (One) Time As Needed for Migraine for up to 1 dose. May repeat in 2 hours if needed  Dispense: 30 tablet; Refill: 2  -     Comprehensive Metabolic Panel; Future  -     CBC Auto Differential; Future  Refilled Maxalt.    5. Encounter for lipid screening for cardiovascular disease  -     Lipid Panel; Future    6. Encounter for screening for  malignant neoplasm of prostate  -     PSA Screen; Future        Patient's Body mass index is 27.53 kg/m². BMI is above normal parameters. Recommendations include: exercise counseling and nutrition counseling.     Findings and plans discussed with patient who verbalizes understanding and agreement. Will followup with patient once results are in. Patient to followup at clinic PRN or in three months for further medical followup.    Kathie Pink MD    EMR Dragon/Transcription Disclaimer:  Much of this encounter note is an electronic transcription/translation of spoken language to printed text.  The electronic translation of spoken language may permit erroneous, or at times, nonsensical words or phrases to be inadvertently transcribed.  Although I have reviewed the note for such errors, some may still exist.

## 2021-01-25 ENCOUNTER — LAB (OUTPATIENT)
Dept: FAMILY MEDICINE CLINIC | Facility: CLINIC | Age: 51
End: 2021-01-25

## 2021-01-25 DIAGNOSIS — Z12.5 ENCOUNTER FOR SCREENING FOR MALIGNANT NEOPLASM OF PROSTATE: Primary | ICD-10-CM

## 2021-01-26 LAB
ALBUMIN SERPL-MCNC: 4.7 G/DL (ref 3.5–5.2)
ALBUMIN/GLOB SERPL: 2.1 G/DL
ALP SERPL-CCNC: 70 U/L (ref 39–117)
ALT SERPL-CCNC: 19 U/L (ref 1–41)
AST SERPL-CCNC: 21 U/L (ref 1–40)
BASOPHILS # BLD AUTO: 0.03 10*3/MM3 (ref 0–0.2)
BASOPHILS NFR BLD AUTO: 0.3 % (ref 0–1.5)
BILIRUB SERPL-MCNC: 0.4 MG/DL (ref 0–1.2)
BUN SERPL-MCNC: 17 MG/DL (ref 6–20)
BUN/CREAT SERPL: 15.3 (ref 7–25)
CALCIUM SERPL-MCNC: 9.6 MG/DL (ref 8.6–10.5)
CHLORIDE SERPL-SCNC: 104 MMOL/L (ref 98–107)
CHOLEST SERPL-MCNC: 215 MG/DL (ref 0–200)
CO2 SERPL-SCNC: 26.8 MMOL/L (ref 22–29)
CREAT SERPL-MCNC: 1.11 MG/DL (ref 0.76–1.27)
EOSINOPHIL # BLD AUTO: 0.12 10*3/MM3 (ref 0–0.4)
EOSINOPHIL NFR BLD AUTO: 1.3 % (ref 0.3–6.2)
ERYTHROCYTE [DISTWIDTH] IN BLOOD BY AUTOMATED COUNT: 13.2 % (ref 12.3–15.4)
GLOBULIN SER CALC-MCNC: 2.2 GM/DL
GLUCOSE SERPL-MCNC: 104 MG/DL (ref 65–99)
HCT VFR BLD AUTO: 47.4 % (ref 37.5–51)
HDLC SERPL-MCNC: 42 MG/DL (ref 40–60)
HGB BLD-MCNC: 15.9 G/DL (ref 13–17.7)
IMM GRANULOCYTES # BLD AUTO: 0.04 10*3/MM3 (ref 0–0.05)
IMM GRANULOCYTES NFR BLD AUTO: 0.4 % (ref 0–0.5)
INTERPRETATION: NORMAL
LDLC SERPL CALC-MCNC: 158 MG/DL (ref 0–100)
LYMPHOCYTES # BLD AUTO: 2.56 10*3/MM3 (ref 0.7–3.1)
LYMPHOCYTES NFR BLD AUTO: 26.8 % (ref 19.6–45.3)
MCH RBC QN AUTO: 29.8 PG (ref 26.6–33)
MCHC RBC AUTO-ENTMCNC: 33.5 G/DL (ref 31.5–35.7)
MCV RBC AUTO: 88.8 FL (ref 79–97)
MONOCYTES # BLD AUTO: 0.85 10*3/MM3 (ref 0.1–0.9)
MONOCYTES NFR BLD AUTO: 8.9 % (ref 5–12)
NEUTROPHILS # BLD AUTO: 5.96 10*3/MM3 (ref 1.7–7)
NEUTROPHILS NFR BLD AUTO: 62.3 % (ref 42.7–76)
NRBC BLD AUTO-RTO: 0.1 /100 WBC (ref 0–0.2)
PLATELET # BLD AUTO: 240 10*3/MM3 (ref 140–450)
POTASSIUM SERPL-SCNC: 5 MMOL/L (ref 3.5–5.2)
PROT SERPL-MCNC: 6.9 G/DL (ref 6–8.5)
PSA SERPL-MCNC: 0.8 NG/ML (ref 0–4)
RBC # BLD AUTO: 5.34 10*6/MM3 (ref 4.14–5.8)
SODIUM SERPL-SCNC: 139 MMOL/L (ref 136–145)
TRIGL SERPL-MCNC: 86 MG/DL (ref 0–150)
VLDLC SERPL CALC-MCNC: 15 MG/DL (ref 5–40)
WBC # BLD AUTO: 9.56 10*3/MM3 (ref 3.4–10.8)

## 2021-01-27 ENCOUNTER — TELEPHONE (OUTPATIENT)
Dept: FAMILY MEDICINE CLINIC | Facility: CLINIC | Age: 51
End: 2021-01-27

## 2021-02-04 RX ORDER — ATORVASTATIN CALCIUM 20 MG/1
20 TABLET, FILM COATED ORAL DAILY
Qty: 30 TABLET | Refills: 2 | Status: SHIPPED | OUTPATIENT
Start: 2021-02-04 | End: 2021-10-26

## 2021-03-12 ENCOUNTER — HOSPITAL ENCOUNTER (OUTPATIENT)
Dept: GENERAL RADIOLOGY | Facility: HOSPITAL | Age: 51
Discharge: HOME | End: 2021-03-12

## 2021-03-12 PROCEDURE — 72100 X-RAY EXAM L-S SPINE 2/3 VWS: CPT

## 2021-03-12 PROCEDURE — 72040 X-RAY EXAM NECK SPINE 2-3 VW: CPT | Performed by: RADIOLOGY

## 2021-03-12 PROCEDURE — 72040 X-RAY EXAM NECK SPINE 2-3 VW: CPT

## 2021-03-12 PROCEDURE — 72100 X-RAY EXAM L-S SPINE 2/3 VWS: CPT | Performed by: RADIOLOGY

## 2021-03-15 ENCOUNTER — OFFICE VISIT (OUTPATIENT)
Dept: FAMILY MEDICINE CLINIC | Facility: CLINIC | Age: 51
End: 2021-03-15

## 2021-03-15 VITALS
HEART RATE: 93 BPM | OXYGEN SATURATION: 95 % | HEIGHT: 74 IN | BODY MASS INDEX: 27.46 KG/M2 | DIASTOLIC BLOOD PRESSURE: 72 MMHG | SYSTOLIC BLOOD PRESSURE: 120 MMHG | WEIGHT: 214 LBS | TEMPERATURE: 96.9 F

## 2021-03-15 DIAGNOSIS — M54.9 UPPER BACK PAIN: ICD-10-CM

## 2021-03-15 DIAGNOSIS — G43.801 OCULAR MIGRAINE WITH STATUS MIGRAINOSUS, NOT INTRACTABLE: Primary | ICD-10-CM

## 2021-03-15 DIAGNOSIS — G89.29 CHRONIC BILATERAL LOW BACK PAIN WITH RIGHT-SIDED SCIATICA: ICD-10-CM

## 2021-03-15 DIAGNOSIS — M54.41 CHRONIC BILATERAL LOW BACK PAIN WITH RIGHT-SIDED SCIATICA: ICD-10-CM

## 2021-03-15 PROCEDURE — 99214 OFFICE O/P EST MOD 30 MIN: CPT | Performed by: FAMILY MEDICINE

## 2021-03-15 RX ORDER — CYCLOBENZAPRINE HCL 5 MG
5 TABLET ORAL 2 TIMES DAILY PRN
Qty: 60 TABLET | Refills: 1 | Status: CANCELLED | OUTPATIENT
Start: 2021-03-15

## 2021-03-16 RX ORDER — CYCLOBENZAPRINE HCL 10 MG
10 TABLET ORAL 2 TIMES DAILY PRN
Qty: 60 TABLET | Refills: 2 | Status: SHIPPED | OUTPATIENT
Start: 2021-03-16 | End: 2021-10-26

## 2021-03-16 RX ORDER — AMITRIPTYLINE HYDROCHLORIDE 10 MG/1
10 TABLET, FILM COATED ORAL NIGHTLY
Qty: 30 TABLET | Refills: 2 | Status: SHIPPED | OUTPATIENT
Start: 2021-03-16 | End: 2021-10-26

## 2021-03-17 DIAGNOSIS — M54.41 CHRONIC BILATERAL LOW BACK PAIN WITH RIGHT-SIDED SCIATICA: ICD-10-CM

## 2021-03-17 DIAGNOSIS — M54.9 UPPER BACK PAIN: ICD-10-CM

## 2021-03-17 DIAGNOSIS — G89.29 CHRONIC BILATERAL LOW BACK PAIN WITH RIGHT-SIDED SCIATICA: ICD-10-CM

## 2021-03-18 ENCOUNTER — BULK ORDERING (OUTPATIENT)
Dept: CASE MANAGEMENT | Facility: OTHER | Age: 51
End: 2021-03-18

## 2021-03-18 DIAGNOSIS — Z23 IMMUNIZATION DUE: ICD-10-CM

## 2021-03-22 RX ORDER — MELOXICAM 7.5 MG/1
TABLET ORAL
Qty: 90 TABLET | Refills: 1 | Status: SHIPPED | OUTPATIENT
Start: 2021-03-22 | End: 2021-10-26

## 2021-04-05 NOTE — PROGRESS NOTES
"Jovany Infante     VITALS: Blood pressure 120/72, pulse 93, temperature 96.9 °F (36.1 °C), height 188 cm (74.02\"), weight 97.1 kg (214 lb), SpO2 95 %.    Subjective  Chief Complaint  Headache (Back pain)    Subjective          History of Present Illness:  Patient is a 51 y.o.  male with medical conditions significant for GERD and BPH who presents to clinic secondary to medical followup.  Patient is complaining about migraines and chronic back pain today.  He continues to work that requires a lot of heavy lifting that exacerbates his back pain.  It is hard to tell whether it is upper or lower back hurt more.  He denies any numbness or tingling in his extremities.  He denies any saddle anesthesia.  He denies any changes in urination or bowel movements.  Migraines happen 3-4 times a week and is throbbing and achy.  He can usually tell when it comes on.    No complaints about any of the medications.    The following portions of the patient's history were reviewed and updated as appropriate: allergies, current medications, past family history, past medical history, past social history, past surgical history and problem list.    Past Medical History  Past Medical History:   Diagnosis Date   • Allergic rhinitis    • Back pain    • BPH (benign prostatic hyperplasia)    • GERD (gastroesophageal reflux disease)    • Heart murmur    • History of ulcer disease        Surgical History  Past Surgical History:   Procedure Laterality Date   • CARPAL TUNNEL RELEASE Right        Family History  Family History   Problem Relation Age of Onset   • No Known Problems Mother    • Hypertension Father    • Alcohol abuse Father    • No Known Problems Brother        Social History  Social History     Socioeconomic History   • Marital status:      Spouse name: Not on file   • Number of children: Not on file   • Years of education: Not on file   • Highest education level: Not on file   Tobacco Use   • Smoking status: Never Smoker   • " "Smokeless tobacco: Current User     Types: Chew   Substance and Sexual Activity   • Alcohol use: No   • Drug use: No   • Sexual activity: Defer       Objective   Vital Signs:   /72 (BP Location: Left arm, Patient Position: Sitting, Cuff Size: Adult)   Pulse 93   Temp 96.9 °F (36.1 °C)   Ht 188 cm (74.02\")   Wt 97.1 kg (214 lb)   SpO2 95%   BMI 27.46 kg/m²     Physical Exam     Gen: Patient in NAD. Pleasant and answers appropriately. A&Ox3.    Skin: Warm and dry with normal turgor. No purpura, rashes, or unusual pigmentation noted. Hair is normal in appearance and distribution.    HEENT: NC/AT. No lesions noted. Conjunctiva clear, sclera nonicteric. PERRL. EOMI without nystagmus or strabismus. Fundi appear benign. No hemorrhages or exudates of eyes. Auditory canals are patent bilaterally without lesions. TMs intact,  nonerythematous, nonbulging without lesions. Nasal mucosa pink, nonerythematous, and nonedematous. Frontal and maxillary sinuses are nontender. O/P nonerythematous and moist without exudate.    Neck: Supple without lymph nodes palpated. FROM.     Lungs: CTA B/L without rales, rhonchi, crackles, or wheezes.    Heart: RRR. S1 and S2 normal. No S3 or S4. No MRGT.    Abd: Soft, nontender,nondistended. (+)BSx4 quadrants.     Extrem: No CCE. Radial pulses 2+/4 and equal B/L.  Decreased range of motion.    Back: Decreased range of motion with tenderness to palpation diffusely.    Neuro: No focal motor/sensory deficits.    Procedures       Assessment and Plan    Jovany Infante is a 51 y.o. here for medical followup.    Problem List Items Addressed This Visit     None      Visit Diagnoses     Ocular migraine with status migrainosus, not intractable    -  Primary    Relevant Medications    cyclobenzaprine (FLEXERIL) 10 MG tablet    amitriptyline (ELAVIL) 10 MG tablet    Chronic bilateral low back pain with right-sided sciatica        Relevant Medications    cyclobenzaprine (FLEXERIL) 10 MG tablet "    amitriptyline (ELAVIL) 10 MG tablet    Upper back pain        Relevant Medications    cyclobenzaprine (FLEXERIL) 10 MG tablet    amitriptyline (ELAVIL) 10 MG tablet            Patient's Body mass index is 27.46 kg/m². BMI is above normal parameters. Recommendations include: exercise counseling and nutrition counseling.           Follow Up   Return in about 3 months (around 6/15/2021).  Findings and plans discussed with patient who verbalizes understanding and agreement. Will followup with patient once results are in. Patient was given instructions and counseling regarding his condition or for health maintenance advice. Please see specific information pulled into the AVS if appropriate.       Kathie Pink MD

## 2021-07-20 ENCOUNTER — APPOINTMENT (OUTPATIENT)
Dept: GENERAL RADIOLOGY | Facility: HOSPITAL | Age: 51
End: 2021-07-20

## 2021-07-20 ENCOUNTER — HOSPITAL ENCOUNTER (EMERGENCY)
Facility: HOSPITAL | Age: 51
Discharge: HOME | End: 2021-07-20
Attending: EMERGENCY MEDICINE | Admitting: EMERGENCY MEDICINE

## 2021-07-20 VITALS
WEIGHT: 200 LBS | SYSTOLIC BLOOD PRESSURE: 125 MMHG | RESPIRATION RATE: 18 BRPM | HEART RATE: 71 BPM | OXYGEN SATURATION: 99 % | DIASTOLIC BLOOD PRESSURE: 81 MMHG | BODY MASS INDEX: 26.51 KG/M2 | HEIGHT: 73 IN | TEMPERATURE: 97.9 F

## 2021-07-20 DIAGNOSIS — R07.9 CHEST PAIN IN ADULT: Primary | ICD-10-CM

## 2021-07-20 DIAGNOSIS — D72.829 LEUKOCYTOSIS, UNSPECIFIED TYPE: ICD-10-CM

## 2021-07-20 LAB
ALBUMIN SERPL-MCNC: 4.89 G/DL (ref 3.5–5.2)
ALBUMIN/GLOB SERPL: 1.8 G/DL
ALP SERPL-CCNC: 78 U/L (ref 39–117)
ALT SERPL W P-5'-P-CCNC: 16 U/L (ref 1–41)
ANION GAP SERPL CALCULATED.3IONS-SCNC: 12.5 MMOL/L (ref 5–15)
AST SERPL-CCNC: 19 U/L (ref 1–40)
BASOPHILS # BLD AUTO: 0.03 10*3/MM3 (ref 0–0.2)
BASOPHILS NFR BLD AUTO: 0.2 % (ref 0–1.5)
BILIRUB SERPL-MCNC: 0.8 MG/DL (ref 0–1.2)
BUN SERPL-MCNC: 12 MG/DL (ref 6–20)
BUN/CREAT SERPL: 11.4 (ref 7–25)
CALCIUM SPEC-SCNC: 9.8 MG/DL (ref 8.6–10.5)
CHLORIDE SERPL-SCNC: 107 MMOL/L (ref 98–107)
CO2 SERPL-SCNC: 23.5 MMOL/L (ref 22–29)
CREAT SERPL-MCNC: 1.05 MG/DL (ref 0.76–1.27)
DEPRECATED RDW RBC AUTO: 41.4 FL (ref 37–54)
EOSINOPHIL # BLD AUTO: 0.03 10*3/MM3 (ref 0–0.4)
EOSINOPHIL NFR BLD AUTO: 0.2 % (ref 0.3–6.2)
ERYTHROCYTE [DISTWIDTH] IN BLOOD BY AUTOMATED COUNT: 13 % (ref 12.3–15.4)
GFR SERPL CREATININE-BSD FRML MDRD: 74 ML/MIN/1.73
GLOBULIN UR ELPH-MCNC: 2.7 GM/DL
GLUCOSE SERPL-MCNC: 114 MG/DL (ref 65–99)
HCT VFR BLD AUTO: 48.3 % (ref 37.5–51)
HGB BLD-MCNC: 16.4 G/DL (ref 13–17.7)
HOLD SPECIMEN: NORMAL
HOLD SPECIMEN: NORMAL
IMM GRANULOCYTES # BLD AUTO: 0.08 10*3/MM3 (ref 0–0.05)
IMM GRANULOCYTES NFR BLD AUTO: 0.4 % (ref 0–0.5)
LYMPHOCYTES # BLD AUTO: 1.81 10*3/MM3 (ref 0.7–3.1)
LYMPHOCYTES NFR BLD AUTO: 9.9 % (ref 19.6–45.3)
MCH RBC QN AUTO: 29.6 PG (ref 26.6–33)
MCHC RBC AUTO-ENTMCNC: 34 G/DL (ref 31.5–35.7)
MCV RBC AUTO: 87.2 FL (ref 79–97)
MONOCYTES # BLD AUTO: 1.06 10*3/MM3 (ref 0.1–0.9)
MONOCYTES NFR BLD AUTO: 5.8 % (ref 5–12)
NEUTROPHILS NFR BLD AUTO: 15.21 10*3/MM3 (ref 1.7–7)
NEUTROPHILS NFR BLD AUTO: 83.5 % (ref 42.7–76)
NRBC BLD AUTO-RTO: 0 /100 WBC (ref 0–0.2)
PLATELET # BLD AUTO: 282 10*3/MM3 (ref 140–450)
PMV BLD AUTO: 9.8 FL (ref 6–12)
POTASSIUM SERPL-SCNC: 3.9 MMOL/L (ref 3.5–5.2)
PROT SERPL-MCNC: 7.6 G/DL (ref 6–8.5)
RBC # BLD AUTO: 5.54 10*6/MM3 (ref 4.14–5.8)
SODIUM SERPL-SCNC: 143 MMOL/L (ref 136–145)
TROPONIN T SERPL-MCNC: <0.01 NG/ML (ref 0–0.03)
TROPONIN T SERPL-MCNC: <0.01 NG/ML (ref 0–0.03)
WBC # BLD AUTO: 18.22 10*3/MM3 (ref 3.4–10.8)
WHOLE BLOOD HOLD SPECIMEN: NORMAL

## 2021-07-20 PROCEDURE — 36415 COLL VENOUS BLD VENIPUNCTURE: CPT

## 2021-07-20 PROCEDURE — 85025 COMPLETE CBC W/AUTO DIFF WBC: CPT | Performed by: PHYSICIAN ASSISTANT

## 2021-07-20 PROCEDURE — 99283 EMERGENCY DEPT VISIT LOW MDM: CPT

## 2021-07-20 PROCEDURE — 71045 X-RAY EXAM CHEST 1 VIEW: CPT | Performed by: RADIOLOGY

## 2021-07-20 PROCEDURE — 80053 COMPREHEN METABOLIC PANEL: CPT | Performed by: PHYSICIAN ASSISTANT

## 2021-07-20 PROCEDURE — 71045 X-RAY EXAM CHEST 1 VIEW: CPT

## 2021-07-20 PROCEDURE — 93005 ELECTROCARDIOGRAM TRACING: CPT | Performed by: EMERGENCY MEDICINE

## 2021-07-20 PROCEDURE — 93005 ELECTROCARDIOGRAM TRACING: CPT | Performed by: PHYSICIAN ASSISTANT

## 2021-07-20 PROCEDURE — 93010 ELECTROCARDIOGRAM REPORT: CPT | Performed by: INTERNAL MEDICINE

## 2021-07-20 PROCEDURE — 84484 ASSAY OF TROPONIN QUANT: CPT | Performed by: PHYSICIAN ASSISTANT

## 2021-07-20 RX ORDER — SODIUM CHLORIDE 0.9 % (FLUSH) 0.9 %
10 SYRINGE (ML) INJECTION AS NEEDED
Status: DISCONTINUED | OUTPATIENT
Start: 2021-07-20 | End: 2021-07-20 | Stop reason: HOSPADM

## 2021-07-20 RX ORDER — ASPIRIN 81 MG/1
324 TABLET, CHEWABLE ORAL ONCE
Status: COMPLETED | OUTPATIENT
Start: 2021-07-20 | End: 2021-07-20

## 2021-07-20 RX ADMIN — ASPIRIN 324 MG: 81 TABLET, CHEWABLE ORAL at 13:28

## 2021-07-22 LAB
QT INTERVAL: 378 MS
QTC INTERVAL: 449 MS

## 2021-08-23 DIAGNOSIS — Z87.898 HISTORY OF ULCER DISEASE: ICD-10-CM

## 2021-08-23 RX ORDER — PANTOPRAZOLE SODIUM 20 MG/1
TABLET, DELAYED RELEASE ORAL
Qty: 90 TABLET | Refills: 0 | Status: SHIPPED | OUTPATIENT
Start: 2021-08-23 | End: 2021-11-23

## 2021-10-26 ENCOUNTER — OFFICE VISIT (OUTPATIENT)
Dept: FAMILY MEDICINE CLINIC | Facility: CLINIC | Age: 51
End: 2021-10-26

## 2021-10-26 VITALS
DIASTOLIC BLOOD PRESSURE: 68 MMHG | BODY MASS INDEX: 27.11 KG/M2 | TEMPERATURE: 97.3 F | SYSTOLIC BLOOD PRESSURE: 128 MMHG | WEIGHT: 204.6 LBS | HEIGHT: 73 IN | OXYGEN SATURATION: 95 % | HEART RATE: 98 BPM

## 2021-10-26 DIAGNOSIS — M72.2 BILATERAL PLANTAR FASCIITIS: Primary | ICD-10-CM

## 2021-10-26 DIAGNOSIS — M72.2 BILATERAL PLANTAR FASCIITIS: ICD-10-CM

## 2021-10-26 PROCEDURE — 99213 OFFICE O/P EST LOW 20 MIN: CPT | Performed by: NURSE PRACTITIONER

## 2021-10-26 RX ORDER — DICLOFENAC SODIUM 30 MG/G
GEL TOPICAL 2 TIMES DAILY
Qty: 100 G | Refills: 0 | Status: SHIPPED | OUTPATIENT
Start: 2021-10-26 | End: 2021-11-25

## 2021-10-26 RX ORDER — IBUPROFEN 800 MG/1
800 TABLET ORAL EVERY 8 HOURS PRN
Qty: 126 TABLET | Refills: 0 | Status: SHIPPED | OUTPATIENT
Start: 2021-10-26 | End: 2021-10-26 | Stop reason: SDUPTHER

## 2021-10-26 NOTE — PROGRESS NOTES
Chief Complaint  Foot Pain    Subjective          Jovany Infante is a 51 y.o. male who presents today to [facility] for initial evaluation       Presents to clinic for complaint of her fasciitis.  Complaining of bilateral heel pain, right foot worse, started about about 1 year ago. Worse in am. Has tried stretching foot by rolling it on on bottles and ASA- not helping. Insoles help. Was supposed to go to foot doctor but didn't go as had covid at that time.  Stands on his feet several hours a day on concrete floor.      HPI:   History of Present Illness    The following portions of the patient's history were reviewed and updated as appropriate: allergies, current medications, past family history, past medical history, past social history, past surgical history and problem list.    Objective     Problem List:  Patient Active Problem List   Diagnosis   • Allergic rhinitis   • BPH (benign prostatic hyperplasia)   • GERD (gastroesophageal reflux disease)       Allergy:   No Known Allergies     Discontinued Medications:  Medications Discontinued During This Encounter   Medication Reason   • meloxicam (MOBIC) 7.5 MG tablet Historical Med - Therapy completed   • cyclobenzaprine (FLEXERIL) 10 MG tablet Historical Med - Therapy completed   • Cyanocobalamin 1000 MCG/ML kit Historical Med - Therapy completed   • atorvastatin (LIPITOR) 20 MG tablet Historical Med - Therapy completed   • amitriptyline (ELAVIL) 10 MG tablet Historical Med - Therapy completed       Current Medications:   Current Outpatient Medications   Medication Sig Dispense Refill   • cetirizine (zyrTEC) 10 MG tablet Take 1 tablet by mouth Daily. 90 tablet 1   • pantoprazole (PROTONIX) 20 MG EC tablet TAKE 1 Tablet BY MOUTH EVERY DAY 90 tablet 0   • Diclofenac Sodium (VOLTAREN) 3 % gel gel Apply  topically to the appropriate area as directed 2 (Two) Times a Day for 30 days. 100 g 0   • ibuprofen (ADVIL,MOTRIN) 800 MG tablet  Take 1 tablet by mouth Every 8 (Eight) Hours As Needed for Mild Pain  for up to 42 days. 126 tablet 0   • rizatriptan (MAXALT) 10 MG tablet Take 1 tablet by mouth 1 (One) Time As Needed for Migraine for up to 1 dose. May repeat in 2 hours if needed 30 tablet 2     No current facility-administered medications for this visit.       Past Medical History:  Past Medical History:   Diagnosis Date   • Allergic rhinitis    • Back pain    • BPH (benign prostatic hyperplasia)    • GERD (gastroesophageal reflux disease)    • Heart murmur    • History of ulcer disease        Past Surgical History:  Past Surgical History:   Procedure Laterality Date   • CARPAL TUNNEL RELEASE Right        Social History:  Social History     Socioeconomic History   • Marital status:    Tobacco Use   • Smoking status: Never Smoker   • Smokeless tobacco: Current User     Types: Chew   Vaping Use   • Vaping Use: Never used   Substance and Sexual Activity   • Alcohol use: No   • Drug use: No   • Sexual activity: Defer       Family History:  Family History   Problem Relation Age of Onset   • No Known Problems Mother    • Hypertension Father    • Alcohol abuse Father    • No Known Problems Brother        Review of Systems:  Review of Systems   Constitutional: Negative for fever.   Cardiovascular: Negative for leg swelling.   Musculoskeletal: Negative for joint swelling.   Skin: Negative for color change.       Physical Exam:  Physical Exam  Vitals and nursing note reviewed.   Constitutional:       General: He is not in acute distress.     Appearance: Normal appearance. He is not ill-appearing or toxic-appearing.   HENT:      Head: Normocephalic.   Cardiovascular:      Rate and Rhythm: Normal rate and regular rhythm.      Pulses: Normal pulses.      Heart sounds: Normal heart sounds.   Pulmonary:      Effort: Pulmonary effort is normal. No respiratory distress.      Breath sounds: Normal breath sounds.   Musculoskeletal:         General: No  "swelling.      Comments: Point tenderness to fascia   Skin:     General: Skin is warm and dry.      Capillary Refill: Capillary refill takes less than 2 seconds.      Coloration: Skin is not pale.   Neurological:      General: No focal deficit present.      Mental Status: He is alert and oriented to person, place, and time.   Psychiatric:         Mood and Affect: Mood normal.         Behavior: Behavior normal.         Thought Content: Thought content normal.         Judgment: Judgment normal.         Vital Signs:   /68 (BP Location: Left arm, Patient Position: Sitting, Cuff Size: Adult)   Pulse 98   Temp 97.3 °F (36.3 °C)   Ht 185.4 cm (72.99\")   Wt 92.8 kg (204 lb 9.6 oz)   SpO2 95%   BMI 27.00 kg/m²  RR: 17            Assessment and Plan   Diagnoses and all orders for this visit:    1. Bilateral plantar fasciitis (Primary)  -     Diclofenac Sodium (VOLTAREN) 3 % gel gel; Apply  topically to the appropriate area as directed 2 (Two) Times a Day for 30 days.  Dispense: 100 g; Refill: 0  -     ibuprofen (ADVIL,MOTRIN) 800 MG tablet; Take 1 tablet by mouth Every 8 (Eight) Hours As Needed for Mild Pain  for up to 42 days.  Dispense: 126 tablet; Refill: 0  -     Ambulatory Referral to Podiatry    Medication regimen as above.  Nonpharmacological interventions as directed.    Discussed possible differential diagnoses, testing, treatment, recommended non-pharmacological interventions, risks, warning signs to monitor for that would indicate need for follow-up in clinic or ER. If no improvement with these regimens or you have new or worsening symptoms follow-up. Patient verbalizes understanding and agreement with plan of care. Denies further needs or concerns.     I spent 15 minutes caring for patient on this date of service. This time includes time spent by me in the following activities: preparing for the visit, reviewing tests, obtaining and/or reviewing a separately obtained history, performing a medically " appropriate examination and/or evaluation, counseling and educating the patient/family/caregiver, ordering medications, tests, or procedures and documenting information in the medical record    Meds ordered during this visit:  New Medications Ordered This Visit   Medications   • Diclofenac Sodium (VOLTAREN) 3 % gel gel     Sig: Apply  topically to the appropriate area as directed 2 (Two) Times a Day for 30 days.     Dispense:  100 g     Refill:  0   • ibuprofen (ADVIL,MOTRIN) 800 MG tablet     Sig: Take 1 tablet by mouth Every 8 (Eight) Hours As Needed for Mild Pain  for up to 42 days.     Dispense:  126 tablet     Refill:  0       Patient Instructions:  Patient instructions given for the following visit diagnosis:    ICD-10-CM ICD-9-CM   1. Bilateral plantar fasciitis  M72.2 728.71       Follow Up   Return in about 4 weeks (around 11/23/2021).        This document has been electronically signed by MAYNOR Johnson  October 26, 2021 12:28 EDT    Patient was given instructions and counseling regarding his condition or for health maintenance advice. Please see specific information pulled into the AVS if appropriate.     Part of this note may be an electronic transcription/translation of spoken language to printed text using the Dragon Dictation System.

## 2021-10-27 RX ORDER — IBUPROFEN 800 MG/1
800 TABLET ORAL EVERY 8 HOURS PRN
Qty: 126 TABLET | Refills: 0 | Status: SHIPPED | OUTPATIENT
Start: 2021-10-27 | End: 2021-12-08

## 2021-11-23 DIAGNOSIS — Z87.898 HISTORY OF ULCER DISEASE: ICD-10-CM

## 2021-11-23 RX ORDER — PANTOPRAZOLE SODIUM 20 MG/1
TABLET, DELAYED RELEASE ORAL
Qty: 90 TABLET | Refills: 0 | Status: SHIPPED | OUTPATIENT
Start: 2021-11-23 | End: 2022-05-31 | Stop reason: DRUGHIGH

## 2021-11-29 ENCOUNTER — OFFICE VISIT (OUTPATIENT)
Dept: FAMILY MEDICINE CLINIC | Facility: CLINIC | Age: 51
End: 2021-11-29

## 2021-11-29 VITALS
OXYGEN SATURATION: 98 % | HEIGHT: 73 IN | BODY MASS INDEX: 27.94 KG/M2 | SYSTOLIC BLOOD PRESSURE: 124 MMHG | TEMPERATURE: 98 F | HEART RATE: 105 BPM | WEIGHT: 210.8 LBS | DIASTOLIC BLOOD PRESSURE: 76 MMHG

## 2021-11-29 DIAGNOSIS — J30.9 ALLERGIC RHINITIS, UNSPECIFIED SEASONALITY, UNSPECIFIED TRIGGER: Primary | ICD-10-CM

## 2021-11-29 PROCEDURE — 99213 OFFICE O/P EST LOW 20 MIN: CPT | Performed by: FAMILY MEDICINE

## 2021-11-29 NOTE — PROGRESS NOTES
Subjective   Jovany Infante is a 51 y.o. male.   Pt presents today with CC of Sinus Problem      History of Present Illness   Patient is a 51-year-old male with long history of seasonal allergic rhinitis.  He currently takes Zyrtec daily.  He also has been taking over-the-counter allergy medications which have been beneficial.  He reports that he had Covid a month ago.  He tested positive at [facility].  He states that it was not a rapid test.  He is agreeable to sign for records.  He states that he was on amoxicillin 500 mg twice daily about 4 weeks ago, he did not get better so about 3 weeks ago he was started on amoxicillin 1000 twice daily.  Again, he did not improve.  His symptoms have not gotten better since having Covid.  He prefers not to be tested for Covid today as he had it a month ago, and he has not had any new symptoms.  He denies any other concerns today.  He denies cough, chest pain, shortness of breath, or headache.             The following portions of the patient's history were reviewed and updated as appropriate: allergies, current medications, past family history, past medical history, past social history, past surgical history and problem list.    Review of Systems   Constitutional: Negative for chills, fever and unexpected weight loss.   HENT: Negative for congestion and sore throat.    Eyes: Negative for blurred vision and visual disturbance.   Respiratory: Negative for cough and wheezing.    Cardiovascular: Negative for chest pain and palpitations.   Gastrointestinal: Negative for abdominal pain and diarrhea.   Endocrine: Negative for cold intolerance and heat intolerance.   Genitourinary: Negative for dysuria.   Musculoskeletal: Negative for arthralgias and neck stiffness.   Neurological: Negative for dizziness, seizures and syncope.   Psychiatric/Behavioral: Negative for self-injury, suicidal ideas and depressed mood.       Objective   Physical Exam  Vitals and nursing  note reviewed.   Constitutional:       Appearance: He is well-developed.   HENT:      Head: Normocephalic and atraumatic.      Right Ear: External ear normal.      Left Ear: External ear normal.      Nose: Nose normal.   Eyes:      Conjunctiva/sclera: Conjunctivae normal.      Pupils: Pupils are equal, round, and reactive to light.   Cardiovascular:      Rate and Rhythm: Normal rate and regular rhythm.      Heart sounds: Normal heart sounds.   Pulmonary:      Effort: Pulmonary effort is normal.      Breath sounds: Normal breath sounds.   Abdominal:      General: Bowel sounds are normal.      Palpations: Abdomen is soft.   Musculoskeletal:      Cervical back: Normal range of motion and neck supple.   Skin:     General: Skin is warm and dry.   Neurological:      Mental Status: He is alert and oriented to person, place, and time.   Psychiatric:         Behavior: Behavior normal.           Assessment/Plan   Diagnoses and all orders for this visit:    1. Allergic rhinitis, unspecified seasonality, unspecified trigger (Primary)    No concerns on exam today.  He has clear rhinorrhea, otherwise normal exam.  Of note, his pulse was 105 when he came in, but it was 80 on exam.  He looks good other than rhinorrhea.  Recommend continuing allergy treatment.  We are to get records from urgent care to get his confirmed positive Covid from a month ago.  For now, I will take his word for it that it was positive.  He does not want to be tested for Covid today.  I think this is reasonable.  If condition does not improve, we could consider running a course of a different antibiotic such as doxycycline, azithromycin, or Levaquin later this week.  As his symptoms have been stable for a month, he had Covid a month ago, his symptoms do get better with allergy medicine, I recommend treating for allergies.  Follow-up as needed                Patient's Body mass index is 27.82 kg/m². indicating that he is overweight (BMI 25-29.9).  Obesity-related health conditions include the following: hypertension. Obesity is unchanged. BMI is is above average; BMI management plan is completed. We discussed portion control and increasing exercise..

## 2021-12-01 ENCOUNTER — TELEPHONE (OUTPATIENT)
Dept: FAMILY MEDICINE CLINIC | Facility: CLINIC | Age: 51
End: 2021-12-01

## 2021-12-01 DIAGNOSIS — R05.9 COUGH: Primary | ICD-10-CM

## 2021-12-01 DIAGNOSIS — R05.9 COUGH: ICD-10-CM

## 2021-12-01 RX ORDER — LEVOFLOXACIN 500 MG/1
500 TABLET, FILM COATED ORAL DAILY
Qty: 7 TABLET | Refills: 0 | Status: SHIPPED | OUTPATIENT
Start: 2021-12-01 | End: 2021-12-29

## 2021-12-01 RX ORDER — BENZONATATE 100 MG/1
100 CAPSULE ORAL 3 TIMES DAILY PRN
Qty: 90 CAPSULE | Refills: 0 | Status: SHIPPED | OUTPATIENT
Start: 2021-12-01 | End: 2021-12-01 | Stop reason: SDUPTHER

## 2021-12-01 RX ORDER — PSEUDOEPHEDRINE HYDROCHLORIDE 60 MG/1
60 TABLET, FILM COATED ORAL 2 TIMES DAILY
Qty: 60 TABLET | Refills: 2 | Status: SHIPPED | OUTPATIENT
Start: 2021-12-01 | End: 2021-12-29 | Stop reason: SDDI

## 2021-12-01 RX ORDER — LEVOFLOXACIN 500 MG/1
500 TABLET, FILM COATED ORAL DAILY
Qty: 7 TABLET | Refills: 0 | Status: SHIPPED | OUTPATIENT
Start: 2021-12-01 | End: 2021-12-01 | Stop reason: SDUPTHER

## 2021-12-01 RX ORDER — PSEUDOEPHEDRINE HYDROCHLORIDE 60 MG/1
60 TABLET, FILM COATED ORAL 2 TIMES DAILY
Qty: 60 TABLET | Refills: 2 | Status: SHIPPED | OUTPATIENT
Start: 2021-12-01 | End: 2021-12-01 | Stop reason: SDUPTHER

## 2021-12-01 RX ORDER — BENZONATATE 100 MG/1
100 CAPSULE ORAL 3 TIMES DAILY PRN
Qty: 90 CAPSULE | Refills: 0 | Status: SHIPPED | OUTPATIENT
Start: 2021-12-01 | End: 2021-12-29

## 2021-12-01 NOTE — TELEPHONE ENCOUNTER
----- Message from Kathie Pink MD sent at 2021 11:15 AM EST -----  Can you call in 3 way cough syrup for Jovany to [facility]?   2 parts robitussin  2 parts albuterol 2 mg/5 ml  2 parts promethazine 6.25 mg/5 ml  Si teaspoons PO TID PRN cough, #6 oz. No refills. Thanks.      Done.

## 2021-12-29 ENCOUNTER — TELEPHONE (OUTPATIENT)
Dept: GENERAL RADIOLOGY | Facility: CLINIC | Age: 51
End: 2021-12-29

## 2021-12-29 ENCOUNTER — OFFICE VISIT (OUTPATIENT)
Dept: FAMILY MEDICINE CLINIC | Facility: CLINIC | Age: 51
End: 2021-12-29

## 2021-12-29 VITALS
HEIGHT: 60 IN | DIASTOLIC BLOOD PRESSURE: 70 MMHG | WEIGHT: 208 LBS | BODY MASS INDEX: 40.84 KG/M2 | SYSTOLIC BLOOD PRESSURE: 118 MMHG | OXYGEN SATURATION: 99 % | TEMPERATURE: 97.1 F | HEART RATE: 81 BPM

## 2021-12-29 DIAGNOSIS — M72.2 BILATERAL PLANTAR FASCIITIS: Primary | ICD-10-CM

## 2021-12-29 PROCEDURE — 99213 OFFICE O/P EST LOW 20 MIN: CPT | Performed by: NURSE PRACTITIONER

## 2021-12-29 RX ORDER — DICLOFENAC SODIUM 30 MG/G
GEL TOPICAL 2 TIMES DAILY PRN
Qty: 100 G | Refills: 0 | Status: SHIPPED | OUTPATIENT
Start: 2021-12-29 | End: 2022-01-28

## 2021-12-29 NOTE — TELEPHONE ENCOUNTER
Jovany ayala [birth year] will need PA on diclefenac gel      Per request from Saima Gibson a PA was submitted for Diclofenac 3% gel; waiting for a response.

## 2021-12-29 NOTE — PROGRESS NOTES
"Chief Complaint  Pain (foot )    Subjective          Jovany Infante is a 51 y.o. male who presents today to [facility] for follow up    HPI:   History of Present Illness    Presents to clinic for follow-up of plantar fasciitis. Was initially evaluated and treated for this on 10/26/2021. Was initially Complaining of bilateral heel pain with right foot worse. Reports left foot is feeling better. Symptoms worse in the morning. Spouse present and reports he is \"Not compliant\" with IBU as stomach bothers him. Has been taking Protonix as prescribed. Reports he did not start diclofenac 3% gel as he needed a PA. Has been doing OTC diclofenac which has helped some. Denies any other acute concerns or issues at this time.    The following portions of the patient's history were reviewed and updated as appropriate: allergies, current medications, past family history, past medical history, past social history, past surgical history and problem list.    Objective     Problem List:  Patient Active Problem List   Diagnosis   • Allergic rhinitis   • BPH (benign prostatic hyperplasia)   • GERD (gastroesophageal reflux disease)       Allergy:   No Known Allergies     Discontinued Medications:  Medications Discontinued During This Encounter   Medication Reason   • benzonatate (Tessalon Perles) 100 MG capsule *Therapy completed   • levoFLOXacin (Levaquin) 500 MG tablet *Therapy completed   • pseudoephedrine (SUDAFED) 60 MG tablet Non-compliance   • rizatriptan (MAXALT) 10 MG tablet Historical Med - Therapy completed       Current Medications:   Current Outpatient Medications   Medication Sig Dispense Refill   • cetirizine (zyrTEC) 10 MG tablet Take 1 tablet by mouth Daily. 90 tablet 1   • pantoprazole (PROTONIX) 20 MG EC tablet TAKE 1 Tablet BY MOUTH EVERY DAY 90 tablet 0   • Diclofenac Sodium (VOLTAREN) 3 % gel gel Apply  topically to the appropriate area as directed 2 (Two) Times a Day As Needed " (pain) for up to 30 days. 100 g 0     No current facility-administered medications for this visit.       Past Medical History:  Past Medical History:   Diagnosis Date   • Allergic rhinitis    • Back pain    • BPH (benign prostatic hyperplasia)    • GERD (gastroesophageal reflux disease)    • Heart murmur    • History of ulcer disease        Past Surgical History:  Past Surgical History:   Procedure Laterality Date   • CARPAL TUNNEL RELEASE Right        Social History:  Social History     Socioeconomic History   • Marital status:    Tobacco Use   • Smoking status: Never Smoker   • Smokeless tobacco: Current User     Types: Chew   Vaping Use   • Vaping Use: Never used   Substance and Sexual Activity   • Alcohol use: No   • Drug use: No   • Sexual activity: Defer       Family History:  Family History   Problem Relation Age of Onset   • No Known Problems Mother    • Hypertension Father    • Alcohol abuse Father    • No Known Problems Brother        Review of Systems:  Review of Systems   Musculoskeletal: Negative for arthralgias and joint swelling.       Physical Exam:  Physical Exam  Vitals and nursing note reviewed.   Constitutional:       General: He is not in acute distress.     Appearance: Normal appearance. He is not ill-appearing or toxic-appearing.   HENT:      Head: Normocephalic.   Cardiovascular:      Rate and Rhythm: Normal rate and regular rhythm.      Pulses: Normal pulses.      Heart sounds: Normal heart sounds.   Pulmonary:      Effort: Pulmonary effort is normal. No respiratory distress.      Breath sounds: Normal breath sounds.   Musculoskeletal:         General: No swelling.      Right lower leg: No edema.      Comments: Tenderness inflammation to right heel and tenderness to arch of right foot   Skin:     General: Skin is warm and dry.      Capillary Refill: Capillary refill takes less than 2 seconds.      Coloration: Skin is not pale.   Neurological:      Mental Status: He is alert and  "oriented to person, place, and time.   Psychiatric:         Mood and Affect: Mood normal.         Behavior: Behavior normal.         Thought Content: Thought content normal.         Judgment: Judgment normal.         Vital Signs:   /70 (BP Location: Right arm)   Pulse 81   Temp 97.1 °F (36.2 °C) (Temporal)   Ht 73 cm (28.74\")   Wt 94.3 kg (208 lb)   SpO2 99%   .10 kg/m²  RR: 16              Assessment and Plan   Diagnoses and all orders for this visit:    1. Bilateral plantar fasciitis (Primary)  -     Diclofenac Sodium (VOLTAREN) 3 % gel gel; Apply  topically to the appropriate area as directed 2 (Two) Times a Day As Needed (pain) for up to 30 days.  Dispense: 100 g; Refill: 0  -     XR Foot 3+ View Right (In Office)    PA for diclofenac 3% gel. Nonpharmacological interventions as discussed. X-ray ordered. If no improvement with this regimen, will consider referral to podiatry.    Discussed possible differential diagnoses, testing, treatment, recommended non-pharmacological interventions, risks, warning signs to monitor for that would indicate need for follow-up in clinic or ER. If no improvement with these regimens or you have new or worsening symptoms follow-up. Patient verbalizes understanding and agreement with plan of care. Denies further needs or concerns.     I spent 20 minutes caring for patient on this date of service. This time includes time spent by me in the following activities: preparing for the visit, reviewing tests, obtaining and/or reviewing a separately obtained history, performing a medically appropriate examination and/or evaluation, counseling and educating the patient/family/caregiver, ordering medications, tests, or procedures and documenting information in the medical record    Meds ordered during this visit:  New Medications Ordered This Visit   Medications   • Diclofenac Sodium (VOLTAREN) 3 % gel gel     Sig: Apply  topically to the appropriate area as directed 2 (Two) " Times a Day As Needed (pain) for up to 30 days.     Dispense:  100 g     Refill:  0       Patient Instructions:  Patient instructions given for the following visit diagnosis:    ICD-10-CM ICD-9-CM   1. Bilateral plantar fasciitis  M72.2 728.71       Follow Up   Return for Annual. Follow-up as needed for foot pain.        This document has been electronically signed by MAYNOR Johnson  December 29, 2021 08:55 EST    Patient was given instructions and counseling regarding his condition or for health maintenance advice. Please see specific information pulled into the AVS if appropriate.     Part of this note may be an electronic transcription/translation of spoken language to printed text using the Dragon Dictation System.

## 2021-12-29 NOTE — TELEPHONE ENCOUNTER
----- Message from MAYNOR Johnson sent at 12/29/2021 12:46 PM EST -----  Please call the patient regarding his result.    X-ray of foot normal      Patient notified.

## 2022-01-10 ENCOUNTER — OFFICE VISIT (OUTPATIENT)
Dept: CARDIOLOGY | Facility: CLINIC | Age: 52
End: 2022-01-10

## 2022-01-10 VITALS
DIASTOLIC BLOOD PRESSURE: 77 MMHG | HEART RATE: 112 BPM | SYSTOLIC BLOOD PRESSURE: 144 MMHG | HEIGHT: 74 IN | TEMPERATURE: 96.6 F | BODY MASS INDEX: 27.11 KG/M2 | RESPIRATION RATE: 16 BRPM | WEIGHT: 211.2 LBS

## 2022-01-10 DIAGNOSIS — R06.09 DYSPNEA ON EXERTION: ICD-10-CM

## 2022-01-10 DIAGNOSIS — R07.2 PRECORDIAL PAIN: Primary | ICD-10-CM

## 2022-01-10 PROCEDURE — 99204 OFFICE O/P NEW MOD 45 MIN: CPT | Performed by: INTERNAL MEDICINE

## 2022-01-10 PROCEDURE — 93000 ELECTROCARDIOGRAM COMPLETE: CPT | Performed by: INTERNAL MEDICINE

## 2022-01-10 NOTE — PROGRESS NOTES
Kathie Pink MD  Jovany Infante  1970  01/10/2022    Patient Active Problem List   Diagnosis   • Allergic rhinitis   • BPH (benign prostatic hyperplasia)   • GERD (gastroesophageal reflux disease)       Dear Kathie Pink MD:    Subjective     Jovany Infante is a 51 y.o. male with the problems as listed above, presents    Chief complaint: Recurrent chest pains over the last 2 to 3 months.    History of Present Illness: Mr. Infante is a pleasant 48-year-old  male with no history of known heart disease or coronary artery disease, nonhypertensive, nondiabetic, non-smoker and no family history of premature coronary disease, presents with complains of having some recurrent chest pains over the last 2 to 3 months.  He describes these pains as being felt as dull pains on the left side of his chest that seem to occur at random with no relation to exertion.  He states he has been under increased stress lately due to his father's passing away recently.  These pains sometimes get better when he moves his left arm.  There is no associated shortness of breath or sweating.  He had dyspnea with moderate exertion since he had COVID infection about 3 months ago.  Denies any PND, orthopnea or pedal edema.  States his blood pressure at home runs good as his wife who is a nurse reportedly checks his blood pressure regularly at home.    Allergies   Allergen Reactions   • Morphine Hallucinations   • Percocet [Oxycodone-Acetaminophen] Hallucinations   :      Current Outpatient Medications:   •  cetirizine (zyrTEC) 10 MG tablet, Take 1 tablet by mouth Daily., Disp: 90 tablet, Rfl: 1  •  pantoprazole (PROTONIX) 20 MG EC tablet, TAKE 1 Tablet BY MOUTH EVERY DAY, Disp: 90 tablet, Rfl: 0  •  Diclofenac Sodium (VOLTAREN) 3 % gel gel, Apply  topically to the appropriate area as directed 2 (Two) Times a Day As Needed (pain) for up to 30 days., Disp: 100 g, Rfl: 0    Past Medical History:   Diagnosis Date   •  "Allergic rhinitis    • Back pain    • BPH (benign prostatic hyperplasia)    • GERD (gastroesophageal reflux disease)    • Heart murmur    • History of ulcer disease      Past Surgical History:   Procedure Laterality Date   • CARPAL TUNNEL RELEASE Right      Family History   Problem Relation Age of Onset   • No Known Problems Mother    • Hypertension Father    • Alcohol abuse Father    • Heart attack Father    • No Known Problems Brother      Social History     Tobacco Use   • Smoking status: Never Smoker   • Smokeless tobacco: Current User     Types: Chew   Vaping Use   • Vaping Use: Never used   Substance Use Topics   • Alcohol use: No   • Drug use: No       Review of Systems   Constitutional: Positive for malaise/fatigue.   HENT: Positive for congestion, hearing loss, sore throat and tinnitus.    Eyes: Positive for visual disturbance.   Cardiovascular: Positive for chest pain.   Musculoskeletal: Positive for back pain.   Gastrointestinal: Positive for abdominal pain and heartburn.        Hx ulcer   Genitourinary: Positive for bladder incontinence, frequency and hematuria.   Neurological: Positive for dizziness, headaches and light-headedness.   Psychiatric/Behavioral: The patient has insomnia.      Objective   Blood pressure 144/77, pulse 112, temperature 96.6 °F (35.9 °C), resp. rate 16, height 188 cm (74\"), weight 95.8 kg (211 lb 3.2 oz).  Body mass index is 27.12 kg/m².    Vitals reviewed.   Constitutional:       Appearance: Well-developed.   Eyes:      Conjunctiva/sclera: Conjunctivae normal.   HENT:      Head: Normocephalic.   Neck:      Thyroid: No thyromegaly.      Vascular: No JVD.      Trachea: No tracheal deviation.   Pulmonary:      Effort: No respiratory distress.      Breath sounds: Normal breath sounds. No wheezing. No rales.   Cardiovascular:      PMI at left midclavicular line. Normal rate. Regular rhythm. Normal S1. Normal S2.      Murmurs: There is no murmur.      No gallop. No click. No rub. "   Pulses:     Intact distal pulses.   Edema:     Peripheral edema absent.   Abdominal:      General: Bowel sounds are normal.      Palpations: Abdomen is soft. There is no abdominal mass.      Tenderness: There is no abdominal tenderness.   Musculoskeletal:      Cervical back: Normal range of motion and neck supple. Skin:     General: Skin is warm and dry.   Neurological:      Mental Status: Alert and oriented to person, place, and time.      Cranial Nerves: No cranial nerve deficit.         Lab Results   Component Value Date     07/20/2021    K 3.9 07/20/2021     07/20/2021    CO2 23.5 07/20/2021    BUN 12 07/20/2021    CREATININE 1.05 07/20/2021    GLUCOSE 114 (H) 07/20/2021    CALCIUM 9.8 07/20/2021    AST 19 07/20/2021    ALT 16 07/20/2021    ALKPHOS 78 07/20/2021    LABIL2 2.1 01/25/2021     No results found for: CKTOTAL  Lab Results   Component Value Date    WBC 18.22 (H) 07/20/2021    HGB 16.4 07/20/2021    HCT 48.3 07/20/2021     07/20/2021     No results found for: INR  Lab Results   Component Value Date    MG 2.3 10/17/2019     Lab Results   Component Value Date    TSH 2.190 10/17/2019    PSA 0.803 01/25/2021    CHLPL 215 (H) 01/25/2021    TRIG 86 01/25/2021    HDL 42 01/25/2021     (H) 01/25/2021          ECG 12 Lead    Date/Time: 1/10/2022 8:48 AM  Performed by: Collin Swain MD  Authorized by: Collin Swain MD   Comparison: compared with previous ECG from 1/20/2021  Similar to previous ECG  Comparison to previous ECG: No significant change from previous EKG except for the heart rate which is little bit faster today.  Rhythm: sinus tachycardia  BPM: 112  Conduction: conduction normal  Other findings: non-specific ST-T wave changes                Assessment/Plan :   Diagnosis Plan   1. Precordial pain  Adult Stress Echo    2. Dyspnea on exertion (NYHA class II)        Recommendations:  Orders Placed This Encounter   Procedures   • ECG 12 Lead      1. We will evaluate him  further with a stress echo study.  2. For now we will have him take an unrecalled aspirin 81 mg daily.      Return in about 3 weeks (around 1/31/2022).    As always, Kathie Pink MD  I appreciate very much the opportunity to participate in the cardiovascular care of your patients. Please do not hesitate to call me with any questions with regards to Jovany Infante's evaluation and management.       With Best Regards,        Collin Swain MD, [facility]    Please note that portions of this note were completed with a voice recognition program.

## 2022-01-18 ENCOUNTER — HOSPITAL ENCOUNTER (OUTPATIENT)
Dept: CARDIOLOGY | Facility: HOSPITAL | Age: 52
Discharge: HOME | End: 2022-01-18

## 2022-01-18 DIAGNOSIS — R07.2 PRECORDIAL PAIN: ICD-10-CM

## 2022-01-18 PROCEDURE — 93320 DOPPLER ECHO COMPLETE: CPT

## 2022-01-18 PROCEDURE — 93325 DOPPLER ECHO COLOR FLOW MAPG: CPT

## 2022-01-18 PROCEDURE — 93351 STRESS TTE COMPLETE: CPT

## 2022-01-18 PROCEDURE — 93320 DOPPLER ECHO COMPLETE: CPT | Performed by: INTERNAL MEDICINE

## 2022-01-18 PROCEDURE — 93351 STRESS TTE COMPLETE: CPT | Performed by: INTERNAL MEDICINE

## 2022-01-18 PROCEDURE — 93325 DOPPLER ECHO COLOR FLOW MAPG: CPT | Performed by: INTERNAL MEDICINE

## 2022-01-19 LAB
BH CV ECHO MEAS - % IVS THICK: -7.6 %
BH CV ECHO MEAS - % LVPW THICK: 4.7 %
BH CV ECHO MEAS - ACS: 2.4 CM
BH CV ECHO MEAS - AO MAX PG: 6 MMHG
BH CV ECHO MEAS - AO MEAN PG: 3 MMHG
BH CV ECHO MEAS - AO ROOT AREA (BSA CORRECTED): 1.6
BH CV ECHO MEAS - AO ROOT AREA: 9.6 CM^2
BH CV ECHO MEAS - AO ROOT DIAM: 3.5 CM
BH CV ECHO MEAS - AO V2 MAX: 122 CM/SEC
BH CV ECHO MEAS - AO V2 MEAN: 75.8 CM/SEC
BH CV ECHO MEAS - AO V2 VTI: 21.1 CM
BH CV ECHO MEAS - BSA(HAYCOCK): 2.2 M^2
BH CV ECHO MEAS - BSA: 2.2 M^2
BH CV ECHO MEAS - BZI_BMI: 27.1 KILOGRAMS/M^2
BH CV ECHO MEAS - BZI_METRIC_HEIGHT: 188 CM
BH CV ECHO MEAS - BZI_METRIC_WEIGHT: 95.7 KG
BH CV ECHO MEAS - EDV(CUBED): 86.9 ML
BH CV ECHO MEAS - EDV(MOD-SP4): 91.3 ML
BH CV ECHO MEAS - EDV(TEICH): 89.1 ML
BH CV ECHO MEAS - EF(CUBED): 64.9 %
BH CV ECHO MEAS - EF(MOD-SP4): 55.4 %
BH CV ECHO MEAS - EF(TEICH): 56.6 %
BH CV ECHO MEAS - ESV(CUBED): 30.5 ML
BH CV ECHO MEAS - ESV(MOD-SP4): 40.7 ML
BH CV ECHO MEAS - ESV(TEICH): 38.7 ML
BH CV ECHO MEAS - FS: 29.5 %
BH CV ECHO MEAS - IVS/LVPW: 1.2
BH CV ECHO MEAS - IVSD: 1 CM
BH CV ECHO MEAS - IVSS: 0.97 CM
BH CV ECHO MEAS - LA DIMENSION: 2.9 CM
BH CV ECHO MEAS - LA/AO: 0.81
BH CV ECHO MEAS - LV DIASTOLIC VOL/BSA (35-75): 41.1 ML/M^2
BH CV ECHO MEAS - LV MASS(C)D: 142.8 GRAMS
BH CV ECHO MEAS - LV MASS(C)DI: 64.2 GRAMS/M^2
BH CV ECHO MEAS - LV MASS(C)S: 80.6 GRAMS
BH CV ECHO MEAS - LV MASS(C)SI: 36.2 GRAMS/M^2
BH CV ECHO MEAS - LV SYSTOLIC VOL/BSA (12-30): 18.3 ML/M^2
BH CV ECHO MEAS - LVIDD: 4.4 CM
BH CV ECHO MEAS - LVIDS: 3.1 CM
BH CV ECHO MEAS - LVLD AP4: 7.5 CM
BH CV ECHO MEAS - LVLS AP4: 5.5 CM
BH CV ECHO MEAS - LVOT AREA (M): 2.8 CM^2
BH CV ECHO MEAS - LVOT AREA: 2.8 CM^2
BH CV ECHO MEAS - LVOT DIAM: 1.9 CM
BH CV ECHO MEAS - LVPWD: 0.89 CM
BH CV ECHO MEAS - LVPWS: 0.93 CM
BH CV ECHO MEAS - MV A MAX VEL: 56.6 CM/SEC
BH CV ECHO MEAS - MV E MAX VEL: 38.6 CM/SEC
BH CV ECHO MEAS - MV E/A: 0.68
BH CV ECHO MEAS - PA ACC TIME: 0.08 SEC
BH CV ECHO MEAS - PA PR(ACCEL): 44.4 MMHG
BH CV ECHO MEAS - RAP SYSTOLE: 10 MMHG
BH CV ECHO MEAS - RVSP: 34.2 MMHG
BH CV ECHO MEAS - SI(AO): 91.3 ML/M^2
BH CV ECHO MEAS - SI(CUBED): 25.4 ML/M^2
BH CV ECHO MEAS - SI(MOD-SP4): 22.8 ML/M^2
BH CV ECHO MEAS - SI(TEICH): 22.7 ML/M^2
BH CV ECHO MEAS - SV(AO): 203 ML
BH CV ECHO MEAS - SV(CUBED): 56.4 ML
BH CV ECHO MEAS - SV(MOD-SP4): 50.6 ML
BH CV ECHO MEAS - SV(TEICH): 50.4 ML
BH CV ECHO MEAS - TR MAX VEL: 246 CM/SEC
BH CV STRESS BP STAGE 1: NORMAL
BH CV STRESS BP STAGE 2: NORMAL
BH CV STRESS DURATION MIN STAGE 1: 3
BH CV STRESS DURATION MIN STAGE 2: 3
BH CV STRESS DURATION SEC STAGE 1: 0
BH CV STRESS DURATION SEC STAGE 2: 0
BH CV STRESS ECHO POST STRESS EJECTION FRACTION EF: 70 %
BH CV STRESS GRADE STAGE 1: 10
BH CV STRESS GRADE STAGE 2: 12
BH CV STRESS HR STAGE 1: 128
BH CV STRESS HR STAGE 2: 148
BH CV STRESS METS STAGE 1: 5
BH CV STRESS METS STAGE 2: 7.5
BH CV STRESS PROTOCOL 1: NORMAL
BH CV STRESS RECOVERY BP: NORMAL MMHG
BH CV STRESS RECOVERY HR: 102 BPM
BH CV STRESS SPEED STAGE 1: 1.7
BH CV STRESS SPEED STAGE 2: 2.5
BH CV STRESS STAGE 1: 1
BH CV STRESS STAGE 2: 2
MAXIMAL PREDICTED HEART RATE: 167 BPM
PERCENT MAX PREDICTED HR: 88.62 %
STRESS BASELINE BP: NORMAL MMHG
STRESS BASELINE HR: 106 BPM
STRESS PERCENT HR: 104 %
STRESS POST ESTIMATED WORKLOAD: 7 METS
STRESS POST EXERCISE DUR MIN: 6 MIN
STRESS POST PEAK BP: NORMAL MMHG
STRESS POST PEAK HR: 148 BPM
STRESS TARGET HR: 142 BPM

## 2022-02-02 ENCOUNTER — OFFICE VISIT (OUTPATIENT)
Dept: CARDIOLOGY | Facility: CLINIC | Age: 52
End: 2022-02-02

## 2022-02-02 VITALS
TEMPERATURE: 96 F | DIASTOLIC BLOOD PRESSURE: 75 MMHG | WEIGHT: 213 LBS | OXYGEN SATURATION: 98 % | SYSTOLIC BLOOD PRESSURE: 125 MMHG | BODY MASS INDEX: 27.34 KG/M2 | HEIGHT: 74 IN | HEART RATE: 85 BPM

## 2022-02-02 DIAGNOSIS — R07.2 PRECORDIAL PAIN: Primary | ICD-10-CM

## 2022-02-02 PROCEDURE — 99213 OFFICE O/P EST LOW 20 MIN: CPT | Performed by: PHYSICIAN ASSISTANT

## 2022-02-02 NOTE — PROGRESS NOTES
Kathie Pink MD  Jovany Infante  1970  02/02/2022    Patient Active Problem List   Diagnosis   • Allergic rhinitis   • BPH (benign prostatic hyperplasia)   • GERD (gastroesophageal reflux disease)       Dear Kathie Pink MD:    Subjective     History of Present Illness:    Chief Complaint   Patient presents with   • Med Management     verbal.    • Results     stress echo.        Jovany Infanet is a pleasant 51 y.o. male with a past medical history significant for no known CAD or structual heart disease. He is not a diabetic, non-hypertensive. He comes in today for results of stress-echocardiogram.     Stress echo overall was unremarkable. Stress portion did not reveal any signs concerning ischemia. Non stress portion also showed normal LVEF with no significant valvular abnormalities. He does still admit to some chest pains that he actually states he can reproduce this pain if he palptates the area or moves a certain direction. He describes this a sharp pain.          Stress echo on 1/19/2022  Interpretation Summary  · Resting Echocardiogram Findings:  · Normal left ventricular cavity size and wall thickness noted. All left ventricular wall segments contract normally.  · Left ventricular ejection fraction appears to be 61 - 65%.  · The mitral valve is structurally normal with no regurgitation or significant stenosis present.  · The aortic valve is structurally normal with no regurgitation or stenosis present.  · There is no evidence of pericardial effusion.  · Stress Procedure  · A stress test was performed following the Jp protocol.  · Exercise duration (min) 6 min Estimated workload 7 METS  · Baseline Vitals Baseline  bpm Baseline /97 mmHg Peak Stress Vitals Peak  bpm Peak /100 mmHg Recovery Vitals Recovery  bpm Recovery /90 mmHg Exercise Data Target HR (85%) 142 bpm Max. Pred. HR (100%) 167 bpm Percent Max Pred HR 88.62 %  · Patient denied any chest  "discomfort during exercise,  · Non-specific ST-T wave changes noted during stress.  · Findings consistent with an indeterminate ECG stress test.  · Stress Echo Findings:  · Segment augmentation had a normal response to stress  · Cavity size behaved normally in response to stress. Left ventricular function is normal. Septal wall motion is normal.  · Normal stress echo with no significant echocardiographic evidence for myocardial ischemia.            Allergies   Allergen Reactions   • Morphine Hallucinations   • Percocet [Oxycodone-Acetaminophen] Hallucinations   :      Current Outpatient Medications:   •  cetirizine (zyrTEC) 10 MG tablet, Take 1 tablet by mouth Daily., Disp: 90 tablet, Rfl: 1  •  Diclofenac Sodium (VOLTAREN) 1 % gel gel, Apply 4 g topically to the appropriate area as directed 4 (Four) Times a Day As Needed., Disp: , Rfl:   •  pantoprazole (PROTONIX) 20 MG EC tablet, TAKE 1 Tablet BY MOUTH EVERY DAY, Disp: 90 tablet, Rfl: 0    The following portions of the patient's history were reviewed and updated as appropriate: allergies, current medications, past family history, past medical history, past social history, past surgical history and problem list.    Social History     Tobacco Use   • Smoking status: Never Smoker   • Smokeless tobacco: Current User     Types: Chew   Vaping Use   • Vaping Use: Never used   Substance Use Topics   • Alcohol use: No   • Drug use: No         Objective   Vitals:    02/02/22 1008   BP: 125/75   BP Location: Left arm   Patient Position: Sitting   Cuff Size: Adult   Pulse: 85   Temp: 96 °F (35.6 °C)   TempSrc: Infrared   SpO2: 98%   Weight: 96.6 kg (213 lb)   Height: 188 cm (74\")     Body mass index is 27.35 kg/m².    Constitutional:       General: Not in acute distress.     Appearance: Healthy appearance. Well-developed and not in distress. Not diaphoretic.   Eyes:      Conjunctiva/sclera: Conjunctivae normal.      Pupils: Pupils are equal, round, and reactive to light. "   HENT:      Head: Normocephalic and atraumatic.   Neck:      Vascular: No carotid bruit or JVD.   Pulmonary:      Effort: Pulmonary effort is normal. No respiratory distress.      Breath sounds: Normal breath sounds.   Cardiovascular:      Normal rate. Regular rhythm.   Skin:     General: Skin is cool.   Neurological:      Mental Status: Alert, oriented to person, place, and time and oriented to person, place and time.         Lab Results   Component Value Date     07/20/2021    K 3.9 07/20/2021     07/20/2021    CO2 23.5 07/20/2021    BUN 12 07/20/2021    CREATININE 1.05 07/20/2021    GLUCOSE 114 (H) 07/20/2021    CALCIUM 9.8 07/20/2021    AST 19 07/20/2021    ALT 16 07/20/2021    ALKPHOS 78 07/20/2021    LABIL2 2.1 01/25/2021     No results found for: CKTOTAL  Lab Results   Component Value Date    WBC 18.22 (H) 07/20/2021    HGB 16.4 07/20/2021    HCT 48.3 07/20/2021     07/20/2021     No results found for: INR  Lab Results   Component Value Date    MG 2.3 10/17/2019     Lab Results   Component Value Date    TSH 2.190 10/17/2019    PSA 0.803 01/25/2021    CHLPL 215 (H) 01/25/2021    TRIG 86 01/25/2021    HDL 42 01/25/2021     (H) 01/25/2021      No results found for: BNP    During this visit the following were done:  Labs Reviewed []    Labs Ordered []    Radiology Reports Reviewed []    Radiology Ordered []    PCP Records Reviewed []    Referring Provider Records Reviewed []    ER Records Reviewed []    Hospital Records Reviewed []    History Obtained From Family []    Radiology Images Reviewed []    Other Reviewed []    Records Requested []       Procedures    Assessment/Plan    Diagnosis Plan   1. Precordial pain              Recommendations:  1. Precordial pain  1.  discussed results of stress-echo with patient.   2. patient's symptoms are atypical and suspicion for underlying CAD with normal stress is low given lack of risk factors. Recommended him speak with pcp regarding non  cardiac work up. Patient expressed understanding.       Return in about 6 months (around 8/2/2022).    As always, I appreciate very much the opportunity to participate in the cardiovascular care of your patients.      With Best Regards,    Frankie Muñoz PA-C

## 2022-05-31 ENCOUNTER — OFFICE VISIT (OUTPATIENT)
Dept: FAMILY MEDICINE CLINIC | Facility: CLINIC | Age: 52
End: 2022-05-31

## 2022-05-31 VITALS
RESPIRATION RATE: 16 BRPM | DIASTOLIC BLOOD PRESSURE: 72 MMHG | OXYGEN SATURATION: 99 % | HEART RATE: 93 BPM | HEIGHT: 74 IN | WEIGHT: 208.6 LBS | BODY MASS INDEX: 26.77 KG/M2 | SYSTOLIC BLOOD PRESSURE: 126 MMHG | TEMPERATURE: 97.1 F

## 2022-05-31 DIAGNOSIS — R07.9 CHEST PAIN, UNSPECIFIED TYPE: ICD-10-CM

## 2022-05-31 DIAGNOSIS — Z12.11 ENCOUNTER FOR SCREENING FOR MALIGNANT NEOPLASM OF COLON: ICD-10-CM

## 2022-05-31 DIAGNOSIS — E86.0 DEHYDRATION: ICD-10-CM

## 2022-05-31 DIAGNOSIS — K21.9 GASTROESOPHAGEAL REFLUX DISEASE WITHOUT ESOPHAGITIS: Primary | ICD-10-CM

## 2022-05-31 LAB
ALBUMIN SERPL-MCNC: 4.69 G/DL (ref 3.5–5.2)
ALBUMIN/GLOB SERPL: 2.1 G/DL
ALP SERPL-CCNC: 78 U/L (ref 39–117)
ALT SERPL W P-5'-P-CCNC: 15 U/L (ref 1–41)
ANION GAP SERPL CALCULATED.3IONS-SCNC: 12.2 MMOL/L (ref 5–15)
AST SERPL-CCNC: 16 U/L (ref 1–40)
BASOPHILS # BLD AUTO: 0.03 10*3/MM3 (ref 0–0.2)
BASOPHILS NFR BLD AUTO: 0.3 % (ref 0–1.5)
BILIRUB SERPL-MCNC: 0.4 MG/DL (ref 0–1.2)
BUN SERPL-MCNC: 14 MG/DL (ref 6–20)
BUN/CREAT SERPL: 12.4 (ref 7–25)
CALCIUM SPEC-SCNC: 9.4 MG/DL (ref 8.6–10.5)
CHLORIDE SERPL-SCNC: 107 MMOL/L (ref 98–107)
CK MB SERPL-CCNC: 1.33 NG/ML
CO2 SERPL-SCNC: 21.8 MMOL/L (ref 22–29)
CREAT SERPL-MCNC: 1.13 MG/DL (ref 0.76–1.27)
DEPRECATED RDW RBC AUTO: 42.1 FL (ref 37–54)
EGFRCR SERPLBLD CKD-EPI 2021: 77.2 ML/MIN/1.73
EOSINOPHIL # BLD AUTO: 0.14 10*3/MM3 (ref 0–0.4)
EOSINOPHIL NFR BLD AUTO: 1.4 % (ref 0.3–6.2)
ERYTHROCYTE [DISTWIDTH] IN BLOOD BY AUTOMATED COUNT: 12.9 % (ref 12.3–15.4)
EXPIRATION DATE: NORMAL
FLUAV AG UPPER RESP QL IA.RAPID: NOT DETECTED
FLUBV AG UPPER RESP QL IA.RAPID: NOT DETECTED
GLOBULIN UR ELPH-MCNC: 2.2 GM/DL
GLUCOSE SERPL-MCNC: 107 MG/DL (ref 65–99)
HCT VFR BLD AUTO: 45.2 % (ref 37.5–51)
HGB BLD-MCNC: 15.1 G/DL (ref 13–17.7)
IMM GRANULOCYTES # BLD AUTO: 0.03 10*3/MM3 (ref 0–0.05)
IMM GRANULOCYTES NFR BLD AUTO: 0.3 % (ref 0–0.5)
INTERNAL CONTROL: NORMAL
LYMPHOCYTES # BLD AUTO: 2.76 10*3/MM3 (ref 0.7–3.1)
LYMPHOCYTES NFR BLD AUTO: 28 % (ref 19.6–45.3)
Lab: NORMAL
MCH RBC QN AUTO: 29.5 PG (ref 26.6–33)
MCHC RBC AUTO-ENTMCNC: 33.4 G/DL (ref 31.5–35.7)
MCV RBC AUTO: 88.5 FL (ref 79–97)
MONOCYTES # BLD AUTO: 0.97 10*3/MM3 (ref 0.1–0.9)
MONOCYTES NFR BLD AUTO: 9.9 % (ref 5–12)
NEUTROPHILS NFR BLD AUTO: 5.91 10*3/MM3 (ref 1.7–7)
NEUTROPHILS NFR BLD AUTO: 60.1 % (ref 42.7–76)
NRBC BLD AUTO-RTO: 0 /100 WBC (ref 0–0.2)
PLATELET # BLD AUTO: 289 10*3/MM3 (ref 140–450)
PMV BLD AUTO: 10.7 FL (ref 6–12)
POTASSIUM SERPL-SCNC: 4 MMOL/L (ref 3.5–5.2)
PROT SERPL-MCNC: 6.9 G/DL (ref 6–8.5)
RBC # BLD AUTO: 5.11 10*6/MM3 (ref 4.14–5.8)
SARS-COV-2 AG UPPER RESP QL IA.RAPID: NOT DETECTED
SODIUM SERPL-SCNC: 141 MMOL/L (ref 136–145)
TROPONIN T SERPL-MCNC: <0.01 NG/ML (ref 0–0.03)
WBC NRBC COR # BLD: 9.84 10*3/MM3 (ref 3.4–10.8)

## 2022-05-31 PROCEDURE — 84484 ASSAY OF TROPONIN QUANT: CPT | Performed by: FAMILY MEDICINE

## 2022-05-31 PROCEDURE — 80053 COMPREHEN METABOLIC PANEL: CPT | Performed by: FAMILY MEDICINE

## 2022-05-31 PROCEDURE — 85025 COMPLETE CBC W/AUTO DIFF WBC: CPT | Performed by: FAMILY MEDICINE

## 2022-05-31 PROCEDURE — 99214 OFFICE O/P EST MOD 30 MIN: CPT | Performed by: FAMILY MEDICINE

## 2022-05-31 PROCEDURE — 93000 ELECTROCARDIOGRAM COMPLETE: CPT | Performed by: FAMILY MEDICINE

## 2022-05-31 PROCEDURE — 82553 CREATINE MB FRACTION: CPT | Performed by: FAMILY MEDICINE

## 2022-05-31 PROCEDURE — 87428 SARSCOV & INF VIR A&B AG IA: CPT | Performed by: FAMILY MEDICINE

## 2022-05-31 PROCEDURE — 36415 COLL VENOUS BLD VENIPUNCTURE: CPT | Performed by: FAMILY MEDICINE

## 2022-05-31 RX ORDER — PANTOPRAZOLE SODIUM 40 MG/1
40 TABLET, DELAYED RELEASE ORAL DAILY
COMMUNITY
End: 2022-05-31 | Stop reason: SDUPTHER

## 2022-05-31 RX ORDER — PANTOPRAZOLE SODIUM 40 MG/1
40 TABLET, DELAYED RELEASE ORAL DAILY
Qty: 90 TABLET | Refills: 1 | Status: SHIPPED | OUTPATIENT
Start: 2022-05-31 | End: 2023-01-31

## 2022-06-08 DIAGNOSIS — Z12.11 ENCOUNTER FOR SCREENING FOR MALIGNANT NEOPLASM OF COLON: Primary | ICD-10-CM

## 2022-07-19 ENCOUNTER — TELEPHONE (OUTPATIENT)
Dept: FAMILY MEDICINE CLINIC | Facility: CLINIC | Age: 52
End: 2022-07-19

## 2022-07-19 NOTE — TELEPHONE ENCOUNTER
Wife called reports patient had a C-Pap a few years ago was recalled never followed up with it,she is requesting a new sleep to see if he still needs this,she thinks he does,she is also requesting fasting lab orders be placed because with his job he will need to go to the hospital at like 5:30 am & get them drawn is requesting a Testosterone level as well.

## 2022-07-29 ENCOUNTER — LAB (OUTPATIENT)
Dept: LAB | Facility: HOSPITAL | Age: 52
End: 2022-07-29

## 2022-07-29 DIAGNOSIS — Z01.818 OTHER SPECIFIED PRE-OPERATIVE EXAMINATION: Primary | ICD-10-CM

## 2022-07-29 PROCEDURE — C9803 HOPD COVID-19 SPEC COLLECT: HCPCS

## 2022-07-29 PROCEDURE — U0004 COV-19 TEST NON-CDC HGH THRU: HCPCS

## 2022-07-30 LAB — SARS-COV-2 RNA PNL SPEC NAA+PROBE: NOT DETECTED

## 2022-10-11 DIAGNOSIS — Z12.11 ENCOUNTER FOR SCREENING FOR MALIGNANT NEOPLASM OF COLON: Primary | ICD-10-CM

## 2022-10-24 ENCOUNTER — TRANSCRIBE ORDERS (OUTPATIENT)
Dept: ADMINISTRATIVE | Facility: HOSPITAL | Age: 52
End: 2022-10-24

## 2022-10-24 DIAGNOSIS — N50.812 TESTICULAR PAIN, LEFT: ICD-10-CM

## 2022-10-24 DIAGNOSIS — N50.811 TESTICULAR PAIN, RIGHT: Primary | ICD-10-CM

## 2022-11-08 ENCOUNTER — HOSPITAL ENCOUNTER (OUTPATIENT)
Dept: ULTRASOUND IMAGING | Facility: HOSPITAL | Age: 52
Discharge: HOME | End: 2022-11-08
Admitting: NURSE PRACTITIONER

## 2022-11-08 DIAGNOSIS — N50.811 TESTICULAR PAIN, RIGHT: ICD-10-CM

## 2022-11-08 DIAGNOSIS — N50.812 TESTICULAR PAIN, LEFT: ICD-10-CM

## 2022-11-08 PROCEDURE — 76870 US EXAM SCROTUM: CPT

## 2022-11-08 PROCEDURE — 76870 US EXAM SCROTUM: CPT | Performed by: RADIOLOGY

## 2023-01-08 ENCOUNTER — E-VISIT (OUTPATIENT)
Dept: FAMILY MEDICINE CLINIC | Facility: TELEHEALTH | Age: 53
End: 2023-01-08
Payer: COMMERCIAL

## 2023-01-08 PROCEDURE — BRIGHTMDVISIT: Performed by: NURSE PRACTITIONER

## 2023-01-09 NOTE — E-VISIT TREATED
Chief Complaint: Low back pain   Patient introduction   Patient is 52-year-old male with pain on right side of lower back. Back pain is not work-related. Not receiving disability compensation related to their back pain. No lawsuit regarding their back pain.   Warning. The following may warrant further investigation:    Pain that is worse at night or when lying down   Patient-submitted comments:   Patient writes: Right side, lower back seems swollen.   Patient requests a 3-day excuse note.   General presentation   Low back pain for less than 1 week. Patient has severe pain that limits some everyday activities.   Pain described as sharp, shooting, stabbing, or burning. Patient suspects current symptoms were caused by heavy lifting. Alleviating factors include changing positions. Aggravating factors include bending over, straightening up, twisting to the side, and leaning to the side.   Prior back pain was similar to current symptoms. Last episode of low back pain was 1 to 3 months ago.   No history of spinal/back procedures in the last year.   Treatments tried for current symptoms:   The following treatments were helpful for current symptoms:    Heat   The following treatments were not helpful for current symptoms: - Acetaminophen    Ibuprofen (800 mg qd)   Review of red flags/alarm symptoms:    No pain along the spine or bony part of the back    Back pain has been present for less than 6 weeks    No back pain due to significant trauma    No urinary incontinence, urinary retention, or fecal incontinence    No history of ED symptoms    No saddle anesthesia    No history of osteoporosis    No abdominal or pelvic pain, GI symptoms, or urinary symptoms    No rash in same area as back pain    No difficulty walking or staying upright as a result of back pain    No bacteremia, sepsis, or endocarditis    No hemodialysis within the last 2 months    No injection drug use in the last 6 months    No bilateral leg weakness     No fever, severe fatigue, unintentional weight loss, or drenching night sweats    No history of cancer   Self-exam:    No radicular pain.    Patient can squat down and rise up to a standing position but right leg is weaker than left (L4 motor screening).    Patient can walk on heels for 10 steps without difficulty (L5 motor screening).    Patient can walk on toes for 10 steps without difficulty (S1 motor screening).   Prognosis and risk stratification:   Patient is not interested in physical therapy.   Current medications   Currently taking pantoprazole 40 MG EC tablet and cetirizine 10 MG tablet.   Medication allergies   None.   Medication contraindication review   No history of heart block/conduction disorders/arrhythmias, ASA- or NSAID-induced asthma or urticaria, aspirin-exacerbated respiratory disease (AERD), congestive heart failure, hyperthyroidism, recent CABG surgery, or recent myocardial infarction.   Past medical history   Immune conditions: No immunocompromising conditions.   Assessment   Low back pain.   This is the likely diagnosis based on patient's interview responses, including the absence of radicular pain.   Plan   Medications:    cyclobenzaprine 10 mg tablet RX 10mg 1 tab PO q8h PRN 5d for spasms, cramping, or tightness of low back muscles. May cause significant drowsiness. Do not take before driving or operating machinery. Amount is 15 tab.    meloxicam 7.5 mg tablet RX 7.5mg 1 tab PO qd PRN 14d for pain. Take with food to avoid an upset stomach. Amount is 14 tab.   The patient's prescriptions will be sent to:   [id number]   [address] [id number]   Phone: [phone]     Fax: [phone]   Other:   Patient was given an excuse note for 3 days.   Education:    Condition and causes    Prevention    Treatment and self-care    When to call provider   ----------   Electronically signed by MAYNOR Coulter on 2023-01-08 at 19:05PM   ----------    Patient Interview Transcript:   Where on your lower back do you feel pain? This interview treats low back pain only. Select one.    Right side   Not selected:    Left side    Both sides    Along the spine or bony part of my back   Since your back pain started, have you had any of these stomach- or bladder-related symptoms? Select all that apply.    None of these   Not selected:    Abdominal pain or discomfort    Nausea    Vomiting    Pain that's worse after eating    Pelvic or lower abdominal pain    Burning with urination    Frequent urination    Blood in your urine   Do any of these statements apply to you? Select all that apply.    None of the above   Not selected:    I've been taking oral steroids such as prednisone for a long time    I have a bruise or scrape on my spine where the pain is    I have osteoporosis   How long have you had your current episode of back pain? Select one.    Less than 1 week   Not selected:    1 to 2 weeks    3 to 4 weeks    5 to 6 weeks    More than 6 weeks   What does the pain feel like? Select one.    Sharp, shooting, stabbing, or burning   Not selected:    Dull or aching    Other (please describe)   How severe is your back pain? Think about how the pain affects your everyday activities. On a scale of 1 to 10, for example, how difficult is it to get out of bed, get dressed, shower, or go to work or school? Select one.    Severe, 7 to 9; my pain is distressing, and it limits me from doing some everyday activities   Not selected:    Mild, 1 to 3; my pain is noticeable and distracting, but I am still able to do everyday activities    Moderate, 4 to 6; my pain is strong, and it makes everyday activities uncomfortable    Unbearable, 10+; my pain is so intense that it keeps me from doing almost all everyday activities   Does the pain travel down from your lower back to your buttock, thigh, lower leg, or foot? Select one.    No   Not selected:    Yes, it travels down my right side    Yes,  it travels down my left side    Yes, it travels down both sides   Since your back pain started, have you had numbness or loss of feeling in the pattern as shown?    No   Not selected:    Yes    I have an underlying condition that causes chronic numbness or loss of feeling in that area   Does your back pain get worse at night or when you're lying down? Select one.    Yes   Not selected:    No   Do any of these make your back pain worse? Select all that apply.    Bending over    Standing up from a bent over position    Twisting to the side    Leaning to the side   Not selected:    Sitting in one position for a long time    Being on my feet for a long time    Physical activity or exercise    Coughing or sneezing    None of the above   Do any of these help your back feel better? Select all that apply.    Frequently changing positions   Not selected:    Resting or lying down    Physical activity or exercise    Stretching    None of the above   Since your back pain started, have you stumbled or fallen because of problems with balance or coordination? Select one.    No   Not selected:    Yes    I have an underlying condition that prevents me from standing or walking    I have an underlying condition that causes problems with my balance or coordination   Along with your back pain, have you noticed a loss of strength in your legs? Select one.    No   Not selected:    Yes, but only in one leg    Yes, in both legs    I have limited or no strength in my legs because of a chronic underlying condition   Since your back pain started, have you had any of these symptoms? Back pain doesn't usually come with other symptoms that affect your whole body. Select all that apply.    None of these   Not selected:    Unexplained fever    Severe fatigue that doesn't improve with rest    Unintentional weight loss    Drenching night sweats   Since your back pain began, have you noticed any loss of bowel or bladder function? This includes  urinating or having a bowel movement when you didn't mean to. It also includes feeling like you can't urinate or empty your bladder all the way. Select one.    No   Not selected:    Yes   Do you have a rash that looks like a stripe of blisters in the same area as your back pain? Select one.    No   Not selected:    Yes   Since your back pain started, has it been difficult to get or keep an erection? Select one.    No   Not selected:    Yes, this is new for me    Yes, I've had this before but it's gotten worse since my back pain started    Yes, I've had this before and it hasn't gotten worse since my back pain started   Can you squat down and then rise to a standing position?    Yes, but my right leg seems weaker than my left leg   Not selected:    Yes, with ease    Yes, but my left leg seems weaker than my right leg    No, my right leg is too weak    No, my left leg is too weak    No, both my legs are too weak   Can you walk on your heels for at least 10 steps?    Yes, with ease   Not selected:    Yes, but it's hard to keep my right toes up    Yes, but it's hard to keep my left toes up    No, because I can't keep my right toes up high enough    No, because I can't keep my left toes up high enough    No, because I can't keep my toes up on both the right and left sides   Can you walk on your toes for at least 10 steps?    Yes, with ease   Not selected:    Yes, but it's harder to keep my right heel up    Yes, but it's harder to keep my left heel up    No, because I can't keep my right heel up high enough    No, because I can't keep my left heel up high enough    No, because I can't keep my heels up on both my right and left sides   The next few questions will ask you about what may have caused your back pain. Was your back pain triggered by any of these activities? Select all that apply.    Heavy lifting   Not selected:    Bending over    A new sport or activity    An awkward position    Intense exercise    A twisting  "motion    Another activity (specify)    No, not that I'm aware of   Is your back pain the result of a serious injury, fall, or accident? A serious injury or fall might include falling off a ladder more than 10 feet high or being involved in a car accident more serious than a \"fender caicedo.\" Select one.    No   Not selected:    Yes   Is your back pain work-related? This includes injuries suffered at work and symptoms caused by repetitive activities at work (such as overuse injuries). Select one.    No   Not selected:    Yes   Within the last year, have you had any medical procedures done on your spine or back? Examples include back or spine surgeries, spinal injections, epidural injections, and epidural catheter placement. Select one.    No   Not selected:    Yes   Have you recently been treated for bacteremia, sepsis, or endocarditis? Bacteremia is a condition where bacteria is found in the blood. Sepsis can be a serious, life-threatening complication of bacteremia. Endocarditis is an infection of the lining of the heart and heart valves. Select one.    No   Not selected:    Yes   Have you had hemodialysis within the last 2 months? Hemodialysis (also known as dialysis) is a treatment for kidney failure. Select one.    No   Not selected:    Yes   In the last 6 months, have you used any injection drugs, such as heroin, cocaine, crystal meth, amphetamines, or opiates? Using injection drugs can put you at risk for serious infections of the spine and surrounding tissues. Your response to this question will only be shared with your healthcare provider. Select one.    No   Not selected:    Yes   Have you had low back pain before? Select one.    Yes, and it felt similar   Not selected:    Yes, but it felt different    No   When was the last time you had low back pain? Select one.    1 to 3 months ago   Not selected:    Within the last month    4 to 6 months ago    More than 6 months ago   Are you currently receiving any " disability compensation related to your back pain? If so, you may be instructed to follow up with your treating physician. Select one.    No   Not selected:    Yes   Are you currently involved in a lawsuit associated with your back pain? Have you filed any legal action regarding your back pain? If so, you may be instructed to follow up with your treating physician. Select one.    No   Not selected:    Yes   Do you have any of these conditions that can affect the immune system? Scroll to see all options. Select all that apply.    None of these   Not selected:    History of bone marrow transplant    Chronic kidney disease    Chronic liver disease (including cirrhosis)    HIV/AIDS    Inflammatory bowel disease (Crohn's disease or ulcerative colitis)    Lupus    Moderate to severe plaque psoriasis    Multiple sclerosis    Rheumatoid arthritis    Sickle cell anemia    Alpha or beta thalassemia    History of solid organ transplant (kidney, liver, or heart)    History of spleen removal    An autoimmune disorder not listed here    A condition requiring treatment with long-term use of oral steroids (such as prednisone, prednisolone, or dexamethasone)   Have you ever been diagnosed with cancer? Select one.    No   Not selected:    Yes, I have cancer now    Yes, but I'm in remission   Have you had gastric bypass surgery? Select one.    No   Not selected:    Yes   Have you tried any treatments for your low back pain? Select one.    Yes   Not selected:    No   Acetaminophen (Tylenol)    Not helpful   Not selected:    Helpful   Heat    Helpful   Not selected:    Not helpful   Ibuprofen (Advil, Motrin)    Not helpful   Not selected:    Helpful   What dose of ibuprofen (Advil, Motrin) are you taking? The dose is the total number of milligrams you take each time. For example, if you take two 200 mg pills at a time, your dose is 400 mg. Select one.    800 mg   Not selected:    100 mg    200 mg    400 mg    600 mg    Other (please  specify)   How often are you taking ibuprofen (Advil, Motrin)? Select one.    1 time a day   Not selected:    2 times a day    3 times a day    4 times a day    More than 4 times a day   If recommended by your provider, would you be willing to try physical therapy? Physical therapy may include hands-on therapy, strength and flexibility exercises, and posture training. We'll keep in mind your preferences when creating a treatment plan. Select all that apply.    No   Not selected:    Yes    Maybe, depending on the cost    Maybe, depending on the time commitment   Let's make sure the medications in your treatment plan are safe for you to take. Are you being treated for any of these conditions? Select all that apply.    None of the above   Not selected:    Arrhythmias, heart block, or conduction disorders    Aspirin- or NSAID-induced asthma or urticaria    Aspirin-exacerbated respiratory disease (AERD)    Congestive heart failure    Hyperthyroidism    Recent coronary artery bypass graft (CABG) surgery    Recent heart attack   Have you taken any monoamine oxidase inhibitor (MAOI) medications in the last 14 days? Examples include rasagiline (Azilect), selegiline (Eldepryl, Zelapar), isocarboxazid (Marplan), phenelzine (Nardil), and tranylcypromine (Parnate). Select one.    No   Not selected:    Yes   Are you taking either of these medications? Select all that apply.    Neither of these   Not selected:    Ciprofloxacin (Cipro)    Fluvoxamine (Luvox)   Are you still taking these medications listed in your medical record? If you're not taking any of these, click Next. Select all that apply.    pantoprazole 40 MG EC tablet    cetirizine 10 MG tablet   Are you taking any other medications, vitamins, or supplements? Select one.    No   Not selected:    Yes   Have you ever had an allergic or bad reaction to any medication? Select one.    No   Not selected:    Yes   Do you need a doctor's note? A doctor's note confirms that you  received care today and states when you can return to school or work. It does not contain information about your diagnosis or treatment plan. Your provider will make the final decision on whether to give you a doctor's note. Doctor's notes CANNOT be backdated. Select one.    3 days   Not selected:    Today only (1 day)    Today and tomorrow (2 days)    No   Is there anything else you'd like to tell us about your symptoms?    Right side, lower back seems swollen   ----------   Medical history   The following information was received from the EMR on January 08, 2023.   Allergies:    PERCOCET [OXYCODONE-ACETAMINOPHEN]   - Allergy Type: Medication   - Reaction: Hallucinations   - Severity: None   - Clinical Status: Active   - Verification Status: Confirmed    MORPHINE   - Allergy Type: Medication   - Reaction: Hallucinations   - Severity: None   - Clinical Status: Active   - Verification Status: Confirmed   Medications:    pantoprazole (PROTONIX) EC tablet   - Route: Oral   - Start Date: May 31, 2022   - End Date: None   - Status: Active    diclofenac (VOLTAREN) gel 1%   - Route: Topical   - Start Date: February 02, 2022   - End Date: None   - Status: Active    cetirizine (zyrTEC) tablet   - Route: Oral   - Start Date: August 10, 2020   - End Date: None   - Status: Active   Problem list:    Allergic rhinitis   - Category: Problem List Item   - Health Status:   - Start Date: January 28, 2019   - End Date: None   - Status: Active    BPH (benign prostatic hyperplasia)   - Category: Problem List Item   - Health Status:   - Start Date: January 28, 2019   - End Date: None   - Status: Active    GERD (gastroesophageal reflux disease)   - Category: Problem List Item   - Health Status:   - Start Date: January 28, 2019   - End Date: None   - Status: Active    Encounter for screening for malignant neoplasm of colon   - Category: Problem List Item   - Health Status:   - Start Date: June 08, 2022   - End Date: None   - Status:  Active

## 2023-01-09 NOTE — EXTERNAL PATIENT INSTRUCTIONS
"   View Doctor's Note     Note   Do not take other nsaids with Meloxicam such as ibuprofen, Aleve or aspirin. May take tylenol if needed for breakthrough pain.   Diagnosis   Low back pain   My name is Joanna Mahan, and I'm a healthcare provider at [facility].   Over 80% of people have low back pain at some point in their lives. Low back pain is rarely serious, and most cases get better on their own, usually within 4 to 6 weeks.   In the Other treatment section below, I've provided some tips to help you feel better.   I've given you a doctor's note for 3 days.   Medications   Your pharmacy   [facility] #[id number] [address] [id number] [phone]     Prescription   Cyclobenzaprine (10mg): Take 1 tablet by mouth every 8 hours as needed for spasms, cramping, or tightness of low back muscles for up to 5 days. This is a muscle relaxant. It may cause significant drowsiness. Do not take before driving or operating machinery.   Meloxicam (7.5mg): Take 1 tablet by mouth once daily as needed for pain for up to 14 days. Take with food to avoid an upset stomach.   About your diagnosis   Low back pain can have many causes. Most of the time, however, people have \"nonspecific low back pain,\" meaning that there isn't a clear disease, abnormality, or injury causing the pain. More serious cases of low back pain involve damaged discs, inflammation or swelling of the spinal joints, misaligned or fractured vertebrae, and, in rare instances, tumors or infections.   Some factors that increase your risk of developing low back pain include:    Older age    Obesity or weight gain: Extra weight puts more stress on a person's back    Low fitness level: When you're less physically fit, you may develop back pain because your back and abdominal muscles don't properly support your spine    A job that requires heavy lifting or twisting    A physically inactive job, such as sitting at a desk all day   Most " people with back pain do not need X-rays, MRIs, or surgery.   What to expect   If you follow the treatments recommended here, you should feel better within a few days. Your low back pain should go away completely within 6 weeks.   Many people will develop low back pain again within 6 to 12 months of the first event. To prevent low back pain in the future, follow the advice in the Prevention section below.   When to seek care   Call us at [phone]   with any sudden or unexpected symptoms.    Your back pain makes doing simple tasks very difficult or impossible.    Your back pain doesn't improve within 6 weeks.    Numbness or weakness in your legs.    Problems controlling your bladder or bowels.    Unexplained weight loss.   Other treatment   One of the best things you can do for your low back pain is keep moving! Studies show that people recover faster from low back pain when they remain active. Walking, light stretching, and regular day-to-day activities all help relieve muscle spasms and prevent loss of muscle strength. Aerobic exercise like swimming, biking, and walking has also been shown to help decrease pain and improve overall physical functioning.   The following treatments and alternative therapies may also be helpful:    Heat: Heating pads can ease your back pain by helping muscles relax.    Yoga: Yoga involves holding and moving between postures with controlled breathing to build strength and flexibility. You may find that yoga not only helps with your back pain but also gives you a greater sense of health and well-being.    Pilates: Pilates also involves controlled movements but focuses on the core muscles found in the abdomen and back. Benefits include better body alignment, core strength, breathing, and balance.    Community-based exercise programs: Group classes combining education and physical training may improve your pain and physical functioning.    Acupuncture, biofeedback, massage, and  spinal manipulation: These therapies teach proper body alignment and relaxation techniques. They may provide pain relief and a general sense of well-being. If you're interested in a referral to one of these therapies, get in touch with your primary care provider for more information.   Prevention   What can you do to prevent back pain?    Keep moving and stay active. Exercises that strengthen and stretch the core muscles, including the back, are best. Try this exercise program from the [employer]: http://orthoinfo.aaos.org/topic.cfm?krkvg=o24678  . It's designed to promote flexibility and strength in the lower back.    Avoid slouching and don't sit or  the same position for too long. Make sure your work surfaces are at a comfortable height.    When bending and lifting, use your legs instead of your back. Bend and lift without twisting. Never lift objects that are too heavy.    Always wear comfortable, low-heeled shoes.    Eat whole, nutrient-dense foods and avoid weight gain. Excess weight around the waistline can strain lower back muscles.    Avoid smoking. Smoking increases the risk of spinal disc degeneration and osteoporosis. Coughing due to heavy smoking may also lead to strained muscles and back pain.   Your provider   Your diagnosis was provided by Joanna Mahan, a member of your trusted care team at [facility].   If you have any questions, call us at [phone]  .   View Doctor's Note     Expires on 02/07/23

## 2023-01-31 ENCOUNTER — OFFICE VISIT (OUTPATIENT)
Dept: FAMILY MEDICINE CLINIC | Facility: CLINIC | Age: 53
End: 2023-01-31
Payer: COMMERCIAL

## 2023-01-31 VITALS
WEIGHT: 216.2 LBS | DIASTOLIC BLOOD PRESSURE: 82 MMHG | TEMPERATURE: 97.7 F | OXYGEN SATURATION: 97 % | HEART RATE: 100 BPM | BODY MASS INDEX: 27.75 KG/M2 | HEIGHT: 74 IN | SYSTOLIC BLOOD PRESSURE: 125 MMHG

## 2023-01-31 DIAGNOSIS — B97.89 VIRAL SINUSITIS: Primary | ICD-10-CM

## 2023-01-31 DIAGNOSIS — J32.9 VIRAL SINUSITIS: Primary | ICD-10-CM

## 2023-01-31 DIAGNOSIS — K21.9 GASTROESOPHAGEAL REFLUX DISEASE WITHOUT ESOPHAGITIS: ICD-10-CM

## 2023-01-31 LAB
EXPIRATION DATE: NORMAL
FLUAV AG UPPER RESP QL IA.RAPID: NOT DETECTED
FLUBV AG UPPER RESP QL IA.RAPID: NOT DETECTED
INTERNAL CONTROL: NORMAL
Lab: NORMAL
SARS-COV-2 AG UPPER RESP QL IA.RAPID: NOT DETECTED

## 2023-01-31 PROCEDURE — 87428 SARSCOV & INF VIR A&B AG IA: CPT | Performed by: NURSE PRACTITIONER

## 2023-01-31 PROCEDURE — 99213 OFFICE O/P EST LOW 20 MIN: CPT | Performed by: NURSE PRACTITIONER

## 2023-01-31 RX ORDER — FLUTICASONE PROPIONATE 50 MCG
2 SPRAY, SUSPENSION (ML) NASAL DAILY
Qty: 15.8 ML | Refills: 0 | Status: SHIPPED | OUTPATIENT
Start: 2023-01-31 | End: 2023-02-28

## 2023-01-31 RX ORDER — BROMPHENIRAMINE MALEATE, PSEUDOEPHEDRINE HYDROCHLORIDE, AND DEXTROMETHORPHAN HYDROBROMIDE 2; 30; 10 MG/5ML; MG/5ML; MG/5ML
5 SYRUP ORAL 4 TIMES DAILY PRN
Qty: 200 ML | Refills: 0 | Status: SHIPPED | OUTPATIENT
Start: 2023-01-31 | End: 2023-04-08

## 2023-01-31 RX ORDER — PANTOPRAZOLE SODIUM 40 MG/1
40 TABLET, DELAYED RELEASE ORAL DAILY
Qty: 90 TABLET | Refills: 0 | Status: SHIPPED | OUTPATIENT
Start: 2023-01-31

## 2023-01-31 NOTE — PROGRESS NOTES
Chief Complaint  sinus congestion (X 2 days/)    Subjective          Jovany Infante is a 52 y.o. male who presents today to [facility] for initial evaluation     HPI:   History of Present Illness    Presents for complaint of sinus congestion x 2 days. Associated symptoms: sinus pressure, drainage. Treatments: OTC treatments with no relief.  No other issues or concerns at this time.    The following portions of the patient's history were reviewed and updated as appropriate: allergies, current medications, past family history, past medical history, past social history, past surgical history and problem list.    Objective     Allergy:   Allergies   Allergen Reactions   • Morphine Hallucinations   • Percocet [Oxycodone-Acetaminophen] Hallucinations        Current Medications:   Current Outpatient Medications   Medication Sig Dispense Refill   • cetirizine (zyrTEC) 10 MG tablet Take 1 tablet by mouth Daily. 90 tablet 1   • pantoprazole (PROTONIX) 40 MG EC tablet Take 1 tablet by mouth Daily. 90 tablet 1   • brompheniramine-pseudoephedrine-DM 30-2-10 MG/5ML syrup Take 5 mL by mouth 4 (Four) Times a Day As Needed for Congestion, Cough or Allergies. 200 mL 0   • fluticasone (FLONASE) 50 MCG/ACT nasal spray 2 sprays into the nostril(s) as directed by provider Daily for 30 days. 15.8 mL 0     No current facility-administered medications for this visit.       Past Medical History:  Past Medical History:   Diagnosis Date   • Allergic rhinitis    • Back pain    • BPH (benign prostatic hyperplasia)    • GERD (gastroesophageal reflux disease)    • Headache    • Heart murmur    • History of ulcer disease    • Hyperlipidemia    • Low back pain    • Sleep apnea        Past Surgical History:  Past Surgical History:   Procedure Laterality Date   • CARPAL TUNNEL RELEASE Right    • ENDOSCOPY     • SKIN GRAFT      legs, stomach, hands       Social History:  Social History     Socioeconomic History   •  "Marital status:    Tobacco Use   • Smoking status: Never   • Smokeless tobacco: Current     Types: Chew   • Tobacco comments:     Smokeless tobacco   Vaping Use   • Vaping Use: Never used   Substance and Sexual Activity   • Alcohol use: Yes     Comment: occasionally   • Drug use: No   • Sexual activity: Yes     Partners: Female     Birth control/protection: None       Family History:  Family History   Problem Relation Age of Onset   • No Known Problems Mother    • Hypertension Father    • Alcohol abuse Father    • Heart attack Father    • Heart disease Father    • No Known Problems Brother        Review of Systems:  Review of Systems   Constitutional: Negative for fever.   Respiratory: Positive for cough. Negative for shortness of breath and wheezing.        Vital Signs:   /82   Pulse 100   Temp 97.7 °F (36.5 °C) (Temporal)   Ht 188 cm (74\")   Wt 98.1 kg (216 lb 3.2 oz)   SpO2 97%   BMI 27.76 kg/m²  RR: 16    Physical Exam:  Physical Exam  Vitals and nursing note reviewed.   Constitutional:       General: He is not in acute distress.     Appearance: Normal appearance. He is not ill-appearing or toxic-appearing.   HENT:      Head: Normocephalic.      Right Ear: Tympanic membrane, ear canal and external ear normal.      Left Ear: Tympanic membrane, ear canal and external ear normal.      Nose: Congestion and rhinorrhea present.      Mouth/Throat:      Mouth: Mucous membranes are moist.      Pharynx: Oropharynx is clear.      Comments: PND  Eyes:      Conjunctiva/sclera: Conjunctivae normal.   Cardiovascular:      Rate and Rhythm: Normal rate and regular rhythm.      Heart sounds: Normal heart sounds.   Pulmonary:      Effort: Pulmonary effort is normal. No respiratory distress.      Breath sounds: Normal breath sounds.   Lymphadenopathy:      Cervical: No cervical adenopathy.   Skin:     General: Skin is warm and dry.      Coloration: Skin is not pale.   Neurological:      Mental Status: He is alert " and oriented to person, place, and time.   Psychiatric:         Mood and Affect: Mood normal.         Behavior: Behavior normal.         Thought Content: Thought content normal.         Judgment: Judgment normal.                  Assessment and Plan   Diagnoses and all orders for this visit:    1. Viral sinusitis (Primary)  -     POCT SARS-CoV-2 Antigen CHRISTIAN + Flu  -     fluticasone (FLONASE) 50 MCG/ACT nasal spray; 2 sprays into the nostril(s) as directed by provider Daily for 30 days.  Dispense: 15.8 mL; Refill: 0  -     brompheniramine-pseudoephedrine-DM 30-2-10 MG/5ML syrup; Take 5 mL by mouth 4 (Four) Times a Day As Needed for Congestion, Cough or Allergies.  Dispense: 200 mL; Refill: 0    Cough and deep breathe.  Increase humidification and hydration.  Symptom management as discussed.    Discussed possible differential diagnoses, testing, treatment, recommended non-pharmacological interventions, risks, warning signs to monitor for that would indicate need for follow-up in clinic or ER. If no improvement with these regimens or you have new or worsening symptoms follow-up. Patient verbalizes understanding and agreement with plan of care. Denies further needs or concerns.     Patient was given instructions and counseling regarding her condition and for health maintenance advised.    BMI is >= 25 and <30. (Overweight) The following options were offered after discussion;: weight loss educational material (shared in after visit summary), exercise counseling/recommendations and nutrition counseling/recommendations       I spent 20 minutes caring for patient on this date of service. This time includes time spent by me in the following activities: preparing for the visit, reviewing tests, obtaining and/or reviewing a separately obtained history, performing a medically appropriate examination and/or evaluation, counseling and educating the patient/family/caregiver, ordering medications, tests, or procedures and  documenting information in the medical record    Meds ordered during this visit:  New Medications Ordered This Visit   Medications   • fluticasone (FLONASE) 50 MCG/ACT nasal spray     Si sprays into the nostril(s) as directed by provider Daily for 30 days.     Dispense:  15.8 mL     Refill:  0   • brompheniramine-pseudoephedrine-DM 30-2-10 MG/5ML syrup     Sig: Take 5 mL by mouth 4 (Four) Times a Day As Needed for Congestion, Cough or Allergies.     Dispense:  200 mL     Refill:  0       Patient Instructions:  Patient instructions given for the following visit diagnosis:    ICD-10-CM ICD-9-CM   1. Viral sinusitis  J32.9 473.9    B97.89 079.99       Follow Up   Return in about 1 week (around 2023) for Recheck, Sooner if needed.        This document has been electronically signed by MAYNOR Johnson  2023 08:55 EST    Patient was given instructions and counseling regarding his condition or for health maintenance advice. Please see specific information pulled into the AVS if appropriate.     Part of this note may be an electronic transcription/translation of spoken language to printed text using the Dragon Dictation System.

## 2023-02-27 DIAGNOSIS — J32.9 VIRAL SINUSITIS: ICD-10-CM

## 2023-02-27 DIAGNOSIS — B97.89 VIRAL SINUSITIS: ICD-10-CM

## 2023-02-28 RX ORDER — FLUTICASONE PROPIONATE 50 MCG
SPRAY, SUSPENSION (ML) NASAL
Qty: 16 G | Refills: 2 | Status: SHIPPED | OUTPATIENT
Start: 2023-02-28

## 2023-04-08 ENCOUNTER — TELEMEDICINE (OUTPATIENT)
Dept: FAMILY MEDICINE CLINIC | Facility: TELEHEALTH | Age: 53
End: 2023-04-08
Payer: COMMERCIAL

## 2023-04-08 DIAGNOSIS — H10.32 ACUTE CONJUNCTIVITIS OF LEFT EYE, UNSPECIFIED ACUTE CONJUNCTIVITIS TYPE: Primary | ICD-10-CM

## 2023-04-08 DIAGNOSIS — J30.1 SEASONAL ALLERGIC RHINITIS DUE TO POLLEN: ICD-10-CM

## 2023-04-08 PROCEDURE — 99213 OFFICE O/P EST LOW 20 MIN: CPT | Performed by: NURSE PRACTITIONER

## 2023-04-08 RX ORDER — OFLOXACIN 3 MG/ML
1 SOLUTION/ DROPS OPHTHALMIC 4 TIMES DAILY
COMMUNITY

## 2023-04-08 RX ORDER — BROMPHENIRAMINE MALEATE, PSEUDOEPHEDRINE HYDROCHLORIDE, AND DEXTROMETHORPHAN HYDROBROMIDE 2; 30; 10 MG/5ML; MG/5ML; MG/5ML
SYRUP ORAL 4 TIMES DAILY PRN
COMMUNITY

## 2023-04-09 NOTE — PATIENT INSTRUCTIONS
Warm compresses to remove eye discharge and warm or cool compressed for comfort.   Wash hands before and after touching eye and instilling eye drops   If symptoms do not improve on 7 days or if symptoms worsen anytime follow up.   May take Bromfed that he has at home for allergies. He will restart the Flonase if needed, but does cause nose bleeds sometimes.     Bacterial Conjunctivitis, Adult  Bacterial conjunctivitis is an infection of your conjunctiva. This is the clear membrane that covers the white part of your eye and the inner part of your eyelid. This infection can make your eye:  Red or pink.  Itchy or irritated.  This condition spreads easily from person to person (is contagious) and from one eye to the other eye.  What are the causes?  This condition is caused by germs (bacteria). You may get the infection if you come into close contact with:  A person who has the infection.  Items that have germs on them (are contaminated), such as face towels, contact lens solution, or eye makeup.  What increases the risk?  You are more likely to get this condition if:  You have contact with people who have the infection.  You wear contact lenses.  You have a sinus infection.  You have had a recent eye injury or surgery.  You have a weak body defense system (immune system).  You have dry eyes.  What are the signs or symptoms?    Thick, yellowish discharge from the eye.  Tearing or watery eyes.  Itchy eyes.  Burning feeling in your eyes.  Eye redness.  Swollen eyelids.  Blurred vision.  How is this treated?    Antibiotic eye drops or ointment.  Antibiotic medicine taken by mouth. This is used for infections that do not get better with drops or ointment or that last more than 10 days.  Cool, wet cloths placed on the eyes.  Artificial tears used 2-6 times a day.  Follow these instructions at home:  Medicines  Take or apply your antibiotic medicine as told by your doctor. Do not stop using it even if you start to feel  better.  Take or apply over-the-counter and prescription medicines only as told by your doctor.  Do not touch your eyelid with the eye-drop bottle or the ointment tube.  Managing discomfort  Wipe any fluid from your eye with a warm, wet washcloth or a cotton ball.  Place a clean, cool, wet cloth on your eye. Do this for 10-20 minutes, 3-4 times a day.  General instructions  Do not wear contacts until the infection is gone. Wear glasses until your doctor says it is okay to wear contacts again.  Do not wear eye makeup until the infection is gone. Throw away old eye makeup.  Change or wash your pillowcase every day.  Do not share towels or washcloths.  Wash your hands often with soap and water for at least 20 seconds and especially before touching your face or eyes. Use paper towels to dry your hands.  Do not touch or rub your eyes.  Do not drive or use heavy machinery if your vision is blurred.  Contact a doctor if:  You have a fever.  You do not get better after 10 days.  Get help right away if:  You have a fever and your symptoms get worse all of a sudden.  You have very bad pain when you move your eye.  Your face:  Hurts.  Is red.  Is swollen.  You have sudden loss of vision.  Summary  Bacterial conjunctivitis is an infection of your conjunctiva.  This infection spreads easily from person to person.  Wash your hands often with soap and water for at least 20 seconds and especially before touching your face or eyes. Use paper towels to dry your hands.  Take or apply your antibiotic medicine as told by your doctor.  Contact a doctor if you have a fever or you do not get better after 10 days.  This information is not intended to replace advice given to you by your health care provider. Make sure you discuss any questions you have with your health care provider.  Document Revised: 03/30/2022 Document Reviewed: 03/30/2022  Elsevier Patient Education © 2022 Elsevier Inc.       Allergic Rhinitis, Adult  Allergic rhinitis  is a reaction to allergens. Allergens are things that can cause an allergic reaction. This condition affects the lining inside the nose (mucous membrane).  There are two types of allergic rhinitis:  Seasonal. This type is also called hay fever. It happens only during some times of the year.  Perennial. This type can happen at any time of the year.  This condition cannot be spread from person to person (is not contagious). It can be mild, worse, or very bad. It can develop at any age and may be outgrown.  What are the causes?    This condition may be caused by:  Pollen from grasses, trees, and weeds.  Dust mites.  Smoke.  Mold.  Car fumes.  The pee (urine), spit, or dander of pets. Dander is dead skin cells from a pet.  What increases the risk?  You are more likely to develop this condition if:  You have allergies in your family.  You have problems like allergies in your family. You may have:  Swelling of parts of your eyes and eyelids.  Asthma. This affects how you breathe.  Long-term redness and swelling on your skin.  Food allergies.  What are the signs or symptoms?  The main symptom of this condition is a runny or stuffy nose (nasal congestion). Other symptoms may include:  Sneezing or coughing.  Itching and tearing of your eyes.  Mucus that drips down the back of your throat (postnasal drip).  Trouble sleeping.  Feeling tired.  Headache.  Sore throat.  How is this treated?  There is no cure for this condition. You should avoid things that you are allergic to. Treatment can help to relieve symptoms. This may include:  Medicines that block allergy symptoms, such as corticosteroids or antihistamines. These may be given as a shot, nasal spray, or pill.  Avoiding things you are allergic to.  Medicines that give you bits of what you are allergic to over time. This is called immunotherapy. It is done if other treatments do not help. You may get:  Shots.  Medicine under your tongue.  Stronger medicines, if other  treatments do not help.  Follow these instructions at home:  Avoiding allergens  Find out what things you are allergic to and avoid them. To do this, try these things:  If you get allergies any time of year:  Replace carpet with wood, tile, or vinyl tony. Carpet can trap pet dander and dust.  Do not smoke. Do not allow smoking in your home.  Change your heating and air conditioning filters at least once a month.  If you get allergies only some times of the year:  Keep windows closed when you can.  Plan things to do outside when pollen counts are lowest. Check pollen counts before you plan things to do outside.  When you come indoors, change your clothes and shower before you sit on furniture or bedding.  If you are allergic to a pet:    Keep the pet out of your bedroom.  Vacuum, sweep, and dust often.     General instructions  Take over-the-counter and prescription medicines only as told by your doctor.  Drink enough fluid to keep your pee (urine) pale yellow.  Keep all follow-up visits as told by your doctor. This is important.  Where to find more information  American Academy of Allergy, Asthma & Immunology: www.aaaai.org  Contact a doctor if:  You have a fever.  You get a cough that does not go away.  You make whistling sounds when you breathe (wheeze).  Your symptoms slow you down.  Your symptoms stop you from doing your normal things each day.  Get help right away if:  You are short of breath.  This symptom may be an emergency. Do not wait to see if the symptom will go away. Get medical help right away. Call your local emergency services (911 in the [place] Do not drive yourself to the hospital.  Summary  Allergic rhinitis may be treated by taking medicines and avoiding things you are allergic to.  If you have allergies only some of the year, keep windows closed when you can at those times.  Contact your doctor if you get a fever or a cough that does not go away.  This information is not intended to replace  advice given to you by your health care provider. Make sure you discuss any questions you have with your health care provider.  Document Revised: 02/08/2021 Document Reviewed: 12/15/2020  Elsevier Patient Education © 2022 Elsevier Inc.

## 2023-04-09 NOTE — PROGRESS NOTES
CHIEF COMPLAINT  Chief Complaint   Patient presents with   • Conjunctivitis         HPI  Jovany Infante is a 53 y.o. male  presents with complaint of left eye conjunctivitis that started this morning. It does not itch. His daughter-in-law had conjunctivitis and was given Ofloxacin. She was given a 2 new boxes and only used one.   He has had no injury to eye and does not wear contacts. No vision changes or sensitivity to sunlight.   He is also having some allergy issues with nasal congestion and sinus drainage with cough.     Review of Systems   Constitutional: Negative for chills, diaphoresis, fatigue and fever.   HENT: Positive for congestion, rhinorrhea, sinus pressure, sneezing and sore throat.    Eyes: Positive for pain, discharge and redness. Negative for photophobia, itching and visual disturbance.   Respiratory: Positive for cough. Negative for chest tightness, shortness of breath and wheezing.    Musculoskeletal: Negative for myalgias.   Neurological: Negative for headaches.       Past Medical History:   Diagnosis Date   • Allergic rhinitis    • Back pain    • BPH (benign prostatic hyperplasia)    • GERD (gastroesophageal reflux disease)    • Headache    • Heart murmur    • History of ulcer disease    • Hyperlipidemia    • Low back pain    • Sleep apnea        Family History   Problem Relation Age of Onset   • No Known Problems Mother    • Hypertension Father    • Alcohol abuse Father    • Heart attack Father    • Heart disease Father    • No Known Problems Brother        Social History     Socioeconomic History   • Marital status:    Tobacco Use   • Smoking status: Never   • Smokeless tobacco: Current     Types: Chew   • Tobacco comments:     Smokeless tobacco   Vaping Use   • Vaping Use: Never used   Substance and Sexual Activity   • Alcohol use: Yes     Comment: occasionally   • Drug use: No   • Sexual activity: Yes     Partners: Female     Birth control/protection: None       Jovany EULALIO  Naresh  reports that he has never smoked. His smokeless tobacco use includes chew.    There were no vitals taken for this visit.    PHYSICAL EXAM  Physical Exam   Constitutional: He is oriented to person, place, and time. He appears well-developed and well-nourished. He does not have a sickly appearance. He does not appear ill. No distress.   HENT:   Head: Normocephalic and atraumatic.   Eyes: EOM are normal.   Neck: Neck normal appearance.  Pulmonary/Chest: Effort normal.  No respiratory distress.  Neurological: He is alert and oriented to person, place, and time.   Skin: Skin is dry.   Psychiatric: He has a normal mood and affect.         Diagnoses and all orders for this visit:    1. Acute conjunctivitis of left eye, unspecified acute conjunctivitis type (Primary)    2. Seasonal allergic rhinitis due to pollen    Likely bacterial conjunctivitis with thick green discharge all day matting eyes closed. No itching with conjunctivitis.   They have an unopened box of Ofloxacin 0.3% ophthalmic solution that was picked up last week at the pharmacy for another family member. They showed this to me and it is unopened. Okay to use this for his eye.     The use of a video visit has been reviewed with the patient and verbal informed consent has been obtained. Myself and Jovany Infante participated in this visit. The patient is located in [address] [place] [address]. I am located in [place], [place]. Mychart and Twilio were utilized.       Note Disclaimer: At [facility], we believe that sharing information builds trust and better   relationships. You are receiving this note because you recently visited [facility]. It is possible you   will see health information before a provider has talked with you about it. This kind of information can   be easy to misunderstand. To help you fully understand what it means for your health, we urge you to   discuss this note with your provider.    Blaire Donald  MAYNOR Saleem  04/08/2023  20:05 EDT

## 2023-04-28 DIAGNOSIS — K21.9 GASTROESOPHAGEAL REFLUX DISEASE WITHOUT ESOPHAGITIS: ICD-10-CM

## 2023-04-28 RX ORDER — PANTOPRAZOLE SODIUM 40 MG/1
40 TABLET, DELAYED RELEASE ORAL DAILY
Qty: 90 TABLET | Refills: 0 | Status: SHIPPED | OUTPATIENT
Start: 2023-04-28

## 2023-07-31 ENCOUNTER — OFFICE VISIT (OUTPATIENT)
Dept: FAMILY MEDICINE CLINIC | Facility: CLINIC | Age: 53
End: 2023-07-31
Payer: COMMERCIAL

## 2023-07-31 ENCOUNTER — TELEPHONE (OUTPATIENT)
Dept: FAMILY MEDICINE CLINIC | Facility: CLINIC | Age: 53
End: 2023-07-31

## 2023-07-31 VITALS — OXYGEN SATURATION: 97 % | HEART RATE: 111 BPM | TEMPERATURE: 103.4 F

## 2023-07-31 DIAGNOSIS — R50.9 FEVER, UNSPECIFIED FEVER CAUSE: ICD-10-CM

## 2023-07-31 DIAGNOSIS — U07.1 COVID-19 VIRUS DETECTED: ICD-10-CM

## 2023-07-31 DIAGNOSIS — R05.1 ACUTE COUGH: ICD-10-CM

## 2023-07-31 DIAGNOSIS — J02.9 SORE THROAT: Primary | ICD-10-CM

## 2023-07-31 LAB
EXPIRATION DATE: ABNORMAL
EXPIRATION DATE: NORMAL
FLUAV AG UPPER RESP QL IA.RAPID: NOT DETECTED
FLUBV AG UPPER RESP QL IA.RAPID: NOT DETECTED
INTERNAL CONTROL: ABNORMAL
INTERNAL CONTROL: NORMAL
Lab: ABNORMAL
Lab: NORMAL
S PYO AG THROAT QL: NEGATIVE
SARS-COV-2 AG UPPER RESP QL IA.RAPID: DETECTED

## 2023-07-31 PROCEDURE — 99213 OFFICE O/P EST LOW 20 MIN: CPT | Performed by: FAMILY MEDICINE

## 2023-07-31 PROCEDURE — 87428 SARSCOV & INF VIR A&B AG IA: CPT | Performed by: FAMILY MEDICINE

## 2023-07-31 PROCEDURE — 87880 STREP A ASSAY W/OPTIC: CPT | Performed by: FAMILY MEDICINE

## 2023-07-31 RX ORDER — ACETAMINOPHEN 500 MG
1000 TABLET ORAL ONCE
Status: SHIPPED | OUTPATIENT
Start: 2023-07-31

## 2023-07-31 RX ORDER — GUAIFENESIN 600 MG/1
1200 TABLET, EXTENDED RELEASE ORAL 2 TIMES DAILY
Qty: 120 TABLET | Refills: 0 | Status: SHIPPED | OUTPATIENT
Start: 2023-07-31

## 2023-07-31 RX ORDER — PSEUDOEPHEDRINE HYDROCHLORIDE 60 MG/1
60 TABLET, FILM COATED ORAL 2 TIMES DAILY
Qty: 60 TABLET | Refills: 0 | Status: SHIPPED | OUTPATIENT
Start: 2023-07-31

## 2023-08-14 DIAGNOSIS — K21.9 GASTROESOPHAGEAL REFLUX DISEASE WITHOUT ESOPHAGITIS: ICD-10-CM

## 2023-08-14 RX ORDER — PANTOPRAZOLE SODIUM 40 MG/1
40 TABLET, DELAYED RELEASE ORAL DAILY
Qty: 90 TABLET | Refills: 1 | Status: SHIPPED | OUTPATIENT
Start: 2023-08-14

## 2023-08-20 NOTE — PROGRESS NOTES
Jovany Infante     VITALS: Pulse 111, temperature (!) 103.4 øF (39.7 øC), temperature source Temporal, SpO2 97 %.    Subjective  Chief Complaint  Nasal Congestion, Headache, Fever, Cough, and Sore Throat    Subjective          History of Present Illness:  Patient is a 53 y.o.  male with medical conditions significant for GERD and BPH who presents to clinic secondary to an acute concern.  Patient has been sick for the last 2 days.  He is complaining of nasal congestion, headache, cough, sore throat.  He states that he has a fever.  He denies any chills.  He denies any rhinorrhea.  No shortness of breath.  Granddaughter was sick earlier this week.    No complaints about any of the medications.    The following portions of the patient's history were reviewed and updated as appropriate: allergies, current medications, past family history, past medical history, past social history, past surgical history and problem list.    Past Medical History  Past Medical History:   Diagnosis Date    Allergic rhinitis     Back pain     BPH (benign prostatic hyperplasia)     GERD (gastroesophageal reflux disease)     Headache     Heart murmur     History of ulcer disease     Hyperlipidemia     Low back pain     Sleep apnea        Surgical History  Past Surgical History:   Procedure Laterality Date    CARPAL TUNNEL RELEASE Right     ENDOSCOPY      SKIN GRAFT      legs, stomach, hands       Family History  Family History   Problem Relation Age of Onset    No Known Problems Mother     Hypertension Father     Alcohol abuse Father     Heart attack Father     Heart disease Father     No Known Problems Brother        Social History  Social History     Socioeconomic History    Marital status:    Tobacco Use    Smoking status: Never    Smokeless tobacco: Current     Types: Chew    Tobacco comments:     Smokeless tobacco   Vaping Use    Vaping Use: Never used   Substance and Sexual Activity    Alcohol use: Yes     Comment:  occasionally    Drug use: No    Sexual activity: Yes     Partners: Female     Birth control/protection: None       Objective   Vital Signs:   Pulse 111   Temp (!) 103.4 øF (39.7 øC) (Temporal)   SpO2 97%     Physical Exam     Gen: Patient in NAD. Pleasant and answers appropriately. A&Ox3.    Skin: Warm and dry with normal turgor. No purpura, rashes, or unusual pigmentation noted. Hair is normal in appearance and distribution.    HEENT: NC/AT. No lesions noted. Conjunctiva clear, sclera nonicteric. PERRL. EOMI without nystagmus or strabismus. Fundi appear benign. No hemorrhages or exudates of eyes. Auditory canals are patent bilaterally without lesions. TMs intact,  nonerythematous, nonbulging without lesions. Nasal mucosa erythematous, and nonedematous. Frontal and maxillary sinuses are nontender. O/P erythematous and moist without exudate.    Neck: Supple without lymph nodes palpated. FROM.     Lungs: CTA B/L without rales, rhonchi, crackles, or wheezes.    Heart: RRR. S1 and S2 normal. No S3 or S4. No MRGT.    Abd: Soft, nontender,nondistended. (+)BSx4 quadrants.     Extrem: No CCE. Radial pulses 2+/4 and equal B/L. FROMx4. No bone, joint, or muscle tenderness noted.    Neuro: No focal motor/sensory deficits.    Procedures    Result Review :   The following data was reviewed by: Kathie Pink MD on 07/31/2023:                Assessment and Plan    Jovany Infante is a 53 y.o. here for an acute concern.    Problem List Items Addressed This Visit    None  Visit Diagnoses       Sore throat    -  Primary    Relevant Medications    guaiFENesin (Mucinex) 600 MG 12 hr tablet    pseudoephedrine (SUDAFED) 60 MG tablet    Other Relevant Orders    POCT rapid strep A (Completed)    POCT SARS-CoV-2 Antigen CHRISTIAN + Flu (Completed)    Fever, unspecified fever cause        Relevant Medications    acetaminophen (TYLENOL) tablet 1,000 mg    guaiFENesin (Mucinex) 600 MG 12 hr tablet    pseudoephedrine (SUDAFED) 60 MG tablet     Other Relevant Orders    POCT SARS-CoV-2 Antigen CHRISTIAN + Flu (Completed)    Acute cough        Relevant Medications    guaiFENesin (Mucinex) 600 MG 12 hr tablet    pseudoephedrine (SUDAFED) 60 MG tablet    Other Relevant Orders    POCT SARS-CoV-2 Antigen CHRISTIAN + Flu (Completed)    COVID-19 virus detected        Relevant Medications    guaiFENesin (Mucinex) 600 MG 12 hr tablet    pseudoephedrine (SUDAFED) 60 MG tablet                              Follow Up   No follow-ups on file.  Findings and plans discussed with patient who verbalizes understanding and agreement. Will followup with patient once results are in. Patient was given instructions and counseling regarding his condition or for health maintenance advice. Please see specific information pulled into the AVS if appropriate.       Kathie Pink MD

## 2024-01-06 ENCOUNTER — HOSPITAL ENCOUNTER (EMERGENCY)
Facility: HOSPITAL | Age: 54
Discharge: HOME | End: 2024-01-06
Attending: STUDENT IN AN ORGANIZED HEALTH CARE EDUCATION/TRAINING PROGRAM
Payer: COMMERCIAL

## 2024-01-06 ENCOUNTER — APPOINTMENT (OUTPATIENT)
Dept: GENERAL RADIOLOGY | Facility: HOSPITAL | Age: 54
End: 2024-01-06
Payer: COMMERCIAL

## 2024-01-06 VITALS
HEART RATE: 100 BPM | OXYGEN SATURATION: 98 % | RESPIRATION RATE: 18 BRPM | BODY MASS INDEX: 27.21 KG/M2 | WEIGHT: 212 LBS | HEIGHT: 74 IN | SYSTOLIC BLOOD PRESSURE: 168 MMHG | DIASTOLIC BLOOD PRESSURE: 94 MMHG | TEMPERATURE: 97.9 F

## 2024-01-06 DIAGNOSIS — R07.9 CHEST PAIN, UNSPECIFIED TYPE: Primary | ICD-10-CM

## 2024-01-06 DIAGNOSIS — I10 HYPERTENSION, UNSPECIFIED TYPE: ICD-10-CM

## 2024-01-06 LAB
ALBUMIN SERPL-MCNC: 4.3 G/DL (ref 3.5–5.2)
ALBUMIN/GLOB SERPL: 1.7 G/DL
ALP SERPL-CCNC: 78 U/L (ref 39–117)
ALT SERPL W P-5'-P-CCNC: 18 U/L (ref 1–41)
ANION GAP SERPL CALCULATED.3IONS-SCNC: 10.4 MMOL/L (ref 5–15)
AST SERPL-CCNC: 21 U/L (ref 1–40)
BASOPHILS # BLD AUTO: 0.04 10*3/MM3 (ref 0–0.2)
BASOPHILS NFR BLD AUTO: 0.3 % (ref 0–1.5)
BILIRUB SERPL-MCNC: 0.4 MG/DL (ref 0–1.2)
BUN SERPL-MCNC: 16 MG/DL (ref 6–20)
BUN/CREAT SERPL: 11.1 (ref 7–25)
CALCIUM SPEC-SCNC: 9.4 MG/DL (ref 8.6–10.5)
CHLORIDE SERPL-SCNC: 107 MMOL/L (ref 98–107)
CO2 SERPL-SCNC: 24.6 MMOL/L (ref 22–29)
CREAT SERPL-MCNC: 1.44 MG/DL (ref 0.76–1.27)
DEPRECATED RDW RBC AUTO: 42.9 FL (ref 37–54)
EGFRCR SERPLBLD CKD-EPI 2021: 57.4 ML/MIN/1.73
EOSINOPHIL # BLD AUTO: 0.22 10*3/MM3 (ref 0–0.4)
EOSINOPHIL NFR BLD AUTO: 1.9 % (ref 0.3–6.2)
ERYTHROCYTE [DISTWIDTH] IN BLOOD BY AUTOMATED COUNT: 13.1 % (ref 12.3–15.4)
GLOBULIN UR ELPH-MCNC: 2.5 GM/DL
GLUCOSE SERPL-MCNC: 113 MG/DL (ref 65–99)
HCT VFR BLD AUTO: 45.5 % (ref 37.5–51)
HGB BLD-MCNC: 15.1 G/DL (ref 13–17.7)
HOLD SPECIMEN: NORMAL
HOLD SPECIMEN: NORMAL
IMM GRANULOCYTES # BLD AUTO: 0.05 10*3/MM3 (ref 0–0.05)
IMM GRANULOCYTES NFR BLD AUTO: 0.4 % (ref 0–0.5)
LYMPHOCYTES # BLD AUTO: 3.02 10*3/MM3 (ref 0.7–3.1)
LYMPHOCYTES NFR BLD AUTO: 25.6 % (ref 19.6–45.3)
MCH RBC QN AUTO: 29.8 PG (ref 26.6–33)
MCHC RBC AUTO-ENTMCNC: 33.2 G/DL (ref 31.5–35.7)
MCV RBC AUTO: 89.9 FL (ref 79–97)
MONOCYTES # BLD AUTO: 0.93 10*3/MM3 (ref 0.1–0.9)
MONOCYTES NFR BLD AUTO: 7.9 % (ref 5–12)
NEUTROPHILS NFR BLD AUTO: 63.9 % (ref 42.7–76)
NEUTROPHILS NFR BLD AUTO: 7.52 10*3/MM3 (ref 1.7–7)
NRBC BLD AUTO-RTO: 0 /100 WBC (ref 0–0.2)
PLATELET # BLD AUTO: 252 10*3/MM3 (ref 140–450)
PMV BLD AUTO: 9.8 FL (ref 6–12)
POTASSIUM SERPL-SCNC: 4.2 MMOL/L (ref 3.5–5.2)
PROT SERPL-MCNC: 6.8 G/DL (ref 6–8.5)
RBC # BLD AUTO: 5.06 10*6/MM3 (ref 4.14–5.8)
SODIUM SERPL-SCNC: 142 MMOL/L (ref 136–145)
TROPONIN T SERPL HS-MCNC: <6 NG/L
WBC NRBC COR # BLD AUTO: 11.78 10*3/MM3 (ref 3.4–10.8)
WHOLE BLOOD HOLD COAG: NORMAL
WHOLE BLOOD HOLD SPECIMEN: NORMAL

## 2024-01-06 PROCEDURE — 71046 X-RAY EXAM CHEST 2 VIEWS: CPT

## 2024-01-06 PROCEDURE — 36415 COLL VENOUS BLD VENIPUNCTURE: CPT

## 2024-01-06 PROCEDURE — 80053 COMPREHEN METABOLIC PANEL: CPT | Performed by: STUDENT IN AN ORGANIZED HEALTH CARE EDUCATION/TRAINING PROGRAM

## 2024-01-06 PROCEDURE — 85025 COMPLETE CBC W/AUTO DIFF WBC: CPT | Performed by: STUDENT IN AN ORGANIZED HEALTH CARE EDUCATION/TRAINING PROGRAM

## 2024-01-06 PROCEDURE — 99284 EMERGENCY DEPT VISIT MOD MDM: CPT

## 2024-01-06 PROCEDURE — 84484 ASSAY OF TROPONIN QUANT: CPT | Performed by: STUDENT IN AN ORGANIZED HEALTH CARE EDUCATION/TRAINING PROGRAM

## 2024-01-06 PROCEDURE — 93005 ELECTROCARDIOGRAM TRACING: CPT | Performed by: STUDENT IN AN ORGANIZED HEALTH CARE EDUCATION/TRAINING PROGRAM

## 2024-01-06 PROCEDURE — 71046 X-RAY EXAM CHEST 2 VIEWS: CPT | Performed by: RADIOLOGY

## 2024-01-06 RX ORDER — SODIUM CHLORIDE 0.9 % (FLUSH) 0.9 %
10 SYRINGE (ML) INJECTION AS NEEDED
Status: DISCONTINUED | OUTPATIENT
Start: 2024-01-06 | End: 2024-01-06 | Stop reason: HOSPADM

## 2024-01-07 LAB
QT INTERVAL: 358 MS
QTC INTERVAL: 475 MS

## 2024-01-07 NOTE — ED PROVIDER NOTES
Subjective   History of Present Illness  53-year-old male past medical history of sleep apnea, GERD presents to the ED with left-sided chest pain and left arm pain.  Patient states that he was buff and out of car the other day and felt a pop and thought that this was what the pain was from.  He states the pain is better when he lifts his arm and it does hurt when palpated.  No shortness of breath, fevers or chills, weakness, leg swelling or tenderness, history of blood clots, or any other symptoms.  Patient did have a negative stress test 2 years ago.  No history of cardiac problems.    History provided by:  Patient   used: No        Review of Systems    Past Medical History:   Diagnosis Date    Allergic rhinitis     Back pain     BPH (benign prostatic hyperplasia)     GERD (gastroesophageal reflux disease)     Headache     Heart murmur     History of ulcer disease     Hyperlipidemia     Low back pain     Sleep apnea        Allergies   Allergen Reactions    Morphine Hallucinations    Percocet [Oxycodone-Acetaminophen] Hallucinations       Past Surgical History:   Procedure Laterality Date    CARPAL TUNNEL RELEASE Right     ENDOSCOPY      SKIN GRAFT      legs, stomach, hands       Family History   Problem Relation Age of Onset    No Known Problems Mother     Hypertension Father     Alcohol abuse Father     Heart attack Father     Heart disease Father     No Known Problems Brother        Social History     Socioeconomic History    Marital status:    Tobacco Use    Smoking status: Never    Smokeless tobacco: Current     Types: Chew    Tobacco comments:     Smokeless tobacco   Vaping Use    Vaping Use: Never used   Substance and Sexual Activity    Alcohol use: Yes     Comment: occasionally    Drug use: No    Sexual activity: Yes     Partners: Female     Birth control/protection: None           Objective   Physical Exam  Constitutional:       General: He is not in acute distress.      Appearance: He is not ill-appearing.   HENT:      Head: Normocephalic and atraumatic.   Eyes:      Conjunctiva/sclera: Conjunctivae normal.   Cardiovascular:      Rate and Rhythm: Regular rhythm. Tachycardia present.      Pulses:           Radial pulses are 2+ on the right side and 2+ on the left side.      Heart sounds: Normal heart sounds.   Pulmonary:      Effort: Pulmonary effort is normal.      Breath sounds: Normal breath sounds.   Chest:      Chest wall: Tenderness present. No deformity or crepitus.   Abdominal:      General: Abdomen is flat.      Palpations: Abdomen is soft.      Tenderness: There is no abdominal tenderness.   Musculoskeletal:      Right lower leg: No edema.      Left lower leg: No edema.   Neurological:      General: No focal deficit present.      Mental Status: He is alert and oriented to person, place, and time. Mental status is at baseline.         Procedures           ED Course  ED Course as of 01/06/24 2001   Sat Jan 06, 2024 1904 EKG obtained on arrival and independently interpreted as sinus tachycardia rate 106 with some diffuse ST depressions, no STEMI criteria met [AS]   1906 ST changes do not appear changed from prior EKG [AS]      ED Course User Index  [AS] Leoncio Estrella MD                HEART Score: 3                              Medical Decision Making  53-year-old male presents ED with left-sided chest pain.  Well-appearing on arrival.  Patient is mildly hypertensive and tachycardic in the low 100s.  Physical exam is consistent with likely MSK pain.  EKG was obtained and did have some concerning ST changes but these were also present on prior EKG from 2022.  Will obtain chest x-ray and labs for further evaluation of cardiac or respiratory causes.  Labs without significant findings, creatinine slightly elevated from baseline, troponin negative.  Given length of symptoms, delta troponin not needed.  Highly likely given patient's story that his pain is MSK in nature.  Will  recommend Tylenol and ibuprofen and have patient follow-up with his primary care provider for elevated blood pressure.  Given appropriate turn precautions for new or worsening symptoms.    Problems Addressed:  Chest pain, unspecified type: complicated acute illness or injury  Hypertension, unspecified type: complicated acute illness or injury    Amount and/or Complexity of Data Reviewed  Labs: ordered.  Radiology: ordered.  ECG/medicine tests: ordered and independent interpretation performed.    Risk  Prescription drug management.        Final diagnoses:   Chest pain, unspecified type   Hypertension, unspecified type       ED Disposition  ED Disposition       ED Disposition   Discharge    Condition   Stable    Comment   --               Kathie Pink MD  [address] [place] [address]  [phone]    Schedule an appointment as soon as possible for a visit       [facility] EMERGENCY DEPARTMENT  [address] [place] [address] [id number] [phone]    If symptoms worsen         Medication List      No changes were made to your prescriptions during this visit.            Leoncio Estrella MD  01/06/24 2001

## 2024-01-07 NOTE — DISCHARGE INSTRUCTIONS
Recommend Tylenol and ibuprofen as needed for pain.  Return to the ED if symptoms significantly worsen or change.  Follow-up with your primary care provider.  Your blood pressure has been elevated while in the ED and recommend that you follow-up with your PCP for this.

## 2024-05-29 ENCOUNTER — OFFICE VISIT (OUTPATIENT)
Dept: FAMILY MEDICINE CLINIC | Facility: CLINIC | Age: 54
End: 2024-05-29
Payer: COMMERCIAL

## 2024-05-29 VITALS
BODY MASS INDEX: 27.13 KG/M2 | HEART RATE: 90 BPM | DIASTOLIC BLOOD PRESSURE: 82 MMHG | HEIGHT: 74 IN | OXYGEN SATURATION: 93 % | WEIGHT: 211.4 LBS | TEMPERATURE: 98.2 F | SYSTOLIC BLOOD PRESSURE: 146 MMHG

## 2024-05-29 DIAGNOSIS — R53.83 OTHER FATIGUE: ICD-10-CM

## 2024-05-29 DIAGNOSIS — Z12.5 SCREENING FOR MALIGNANT NEOPLASM OF PROSTATE: ICD-10-CM

## 2024-05-29 DIAGNOSIS — R73.09 ABNORMAL GLUCOSE: ICD-10-CM

## 2024-05-29 DIAGNOSIS — R31.9 HEMATURIA, UNSPECIFIED TYPE: ICD-10-CM

## 2024-05-29 DIAGNOSIS — E55.9 VITAMIN D DEFICIENCY: ICD-10-CM

## 2024-05-29 DIAGNOSIS — R03.0 ELEVATED BLOOD PRESSURE READING: ICD-10-CM

## 2024-05-29 DIAGNOSIS — R06.02 SHORTNESS OF BREATH: ICD-10-CM

## 2024-05-29 DIAGNOSIS — G89.29 CHRONIC LEFT SHOULDER PAIN: Primary | ICD-10-CM

## 2024-05-29 DIAGNOSIS — M25.59 PAIN IN OTHER JOINT: ICD-10-CM

## 2024-05-29 DIAGNOSIS — M25.512 CHRONIC LEFT SHOULDER PAIN: Primary | ICD-10-CM

## 2024-05-29 DIAGNOSIS — G47.09 OTHER INSOMNIA: ICD-10-CM

## 2024-05-29 DIAGNOSIS — R42 DIZZINESS: ICD-10-CM

## 2024-05-29 PROCEDURE — 99214 OFFICE O/P EST MOD 30 MIN: CPT | Performed by: NURSE PRACTITIONER

## 2024-05-29 NOTE — PROGRESS NOTES
[facility]     Chief Complaint  Dizziness, Fatigue, and Shoulder Pain    Jovany Infante is a 54 y.o. male who presents today to [facility] for Dizziness, Fatigue, and Shoulder Pain.    HPI:   HPI   Patient presents today with left shoulder pain that has been present for quite a while. States it goes under his left chest muscle and feels dull and shoots through his back and feels sharp. He does have a past hx of having blood in his urine and has been evaluated per urology who reports he has a thickened bladder wall and are monitoring. Pain in his shoulder and chest wall is pretty consistent and occasionally his arm will go numb. He did have eval In the ER a few months ago few chest pain and cleared as far as cardiac work up went.     Previous History:   Past Medical History:   Diagnosis Date    Allergic rhinitis     Back pain     BPH (benign prostatic hyperplasia)     GERD (gastroesophageal reflux disease)     Headache     Heart murmur     History of ulcer disease     Hyperlipidemia     Low back pain     Sleep apnea       Past Surgical History:   Procedure Laterality Date    CARPAL TUNNEL RELEASE Right     ENDOSCOPY      SKIN GRAFT      legs, stomach, hands      Social History     Socioeconomic History    Marital status:    Tobacco Use    Smoking status: Never    Smokeless tobacco: Current     Types: Chew    Tobacco comments:     Smokeless tobacco   Vaping Use    Vaping status: Never Used   Substance and Sexual Activity    Alcohol use: Yes     Comment: occasionally    Drug use: No    Sexual activity: Yes     Partners: Female     Birth control/protection: None      Health Maintenance Due   Topic Date Due    ZOSTER VACCINE (1 of 2) Never done    HEPATITIS C SCREENING  Never done    ANNUAL PHYSICAL  Never done    LIPID PANEL  01/25/2022    COVID-19 Vaccine (1 - 2023-24 season) Never done    BMI FOLLOWUP  01/31/2024        Current Medications:  Current Outpatient  "Medications   Medication Sig Dispense Refill    cetirizine (zyrTEC) 10 MG tablet Take 1 tablet by mouth Daily. 90 tablet 1    pantoprazole (PROTONIX) 40 MG EC tablet Take 1 tablet by mouth Daily. 90 tablet 1     No current facility-administered medications for this visit.       Allergies:   Allergies   Allergen Reactions    Morphine Hallucinations    Percocet [Oxycodone-Acetaminophen] Hallucinations       Vitals:   /82 (BP Location: Right arm, Patient Position: Sitting, Cuff Size: Adult)   Pulse 90   Temp 98.2 °F (36.8 °C) (Temporal)   Ht 188 cm (74.02\")   Wt 95.9 kg (211 lb 6.4 oz)   SpO2 93%   BMI 27.13 kg/m²   Estimated body mass index is 27.13 kg/m² as calculated from the following:    Height as of this encounter: 188 cm (74.02\").    Weight as of this encounter: 95.9 kg (211 lb 6.4 oz).        Physical Exam:   Physical Exam  Vitals reviewed.   Constitutional:       General: He is not in acute distress.     Appearance: Normal appearance.   HENT:      Head: Normocephalic and atraumatic.      Right Ear: Tympanic membrane and ear canal normal.      Left Ear: Tympanic membrane and ear canal normal.      Nose: Nose normal.      Mouth/Throat:      Mouth: Mucous membranes are moist.      Pharynx: Oropharynx is clear.   Eyes:      General: Lids are normal.      Extraocular Movements: Extraocular movements intact.      Conjunctiva/sclera: Conjunctivae normal.      Pupils: Pupils are equal, round, and reactive to light.   Neck:      Thyroid: No thyroid mass or thyromegaly.   Cardiovascular:      Rate and Rhythm: Normal rate.      Pulses: Normal pulses.      Heart sounds: Normal heart sounds. No murmur heard.  Pulmonary:      Effort: Pulmonary effort is normal. No respiratory distress or retractions.      Breath sounds: Normal breath sounds.   Abdominal:      General: Bowel sounds are normal. There is no distension.      Palpations: Abdomen is soft. There is no mass.      Tenderness: There is no abdominal " tenderness.   Musculoskeletal:         General: No swelling.      Cervical back: Full passive range of motion without pain and neck supple. No rigidity.      Right lower leg: No edema.      Left lower leg: No edema.      Comments: Left shoulder: Painful ROM    Lymphadenopathy:      Cervical: No cervical adenopathy.   Skin:     General: Skin is warm and dry.      Capillary Refill: Capillary refill takes less than 2 seconds.   Neurological:      General: No focal deficit present.      Mental Status: He is alert and oriented to person, place, and time. Mental status is at baseline.      Cranial Nerves: No cranial nerve deficit.      Sensory: No sensory deficit.      Gait: Gait normal.   Psychiatric:         Attention and Perception: Attention and perception normal.         Mood and Affect: Mood and affect normal.         Speech: Speech normal.         Behavior: Behavior is cooperative.         Thought Content: Thought content is not paranoid. Thought content does not include homicidal or suicidal ideation.         Cognition and Memory: Cognition normal.         Judgment: Judgment normal.          Lab Results:   No visits with results within 3 Month(s) from this visit.   Latest known visit with results is:   Admission on 01/06/2024, Discharged on 01/06/2024   Component Date Value Ref Range Status    QT Interval 01/06/2024 358  ms Final    QTC Interval 01/06/2024 475  ms Final    Glucose 01/06/2024 113 (H)  65 - 99 mg/dL Final    BUN 01/06/2024 16  6 - 20 mg/dL Final    Creatinine 01/06/2024 1.44 (H)  0.76 - 1.27 mg/dL Final    Sodium 01/06/2024 142  136 - 145 mmol/L Final    Potassium 01/06/2024 4.2  3.5 - 5.2 mmol/L Final    Slight hemolysis detected by analyzer. Result may be falsely elevated.    Chloride 01/06/2024 107  98 - 107 mmol/L Final    CO2 01/06/2024 24.6  22.0 - 29.0 mmol/L Final    Calcium 01/06/2024 9.4  8.6 - 10.5 mg/dL Final    Total Protein 01/06/2024 6.8  6.0 - 8.5 g/dL Final    Albumin 01/06/2024 4.3   3.5 - 5.2 g/dL Final    ALT (SGPT) 01/06/2024 18  1 - 41 U/L Final    AST (SGOT) 01/06/2024 21  1 - 40 U/L Final    Alkaline Phosphatase 01/06/2024 78  39 - 117 U/L Final    Total Bilirubin 01/06/2024 0.4  0.0 - 1.2 mg/dL Final    Globulin 01/06/2024 2.5  gm/dL Final    A/G Ratio 01/06/2024 1.7  g/dL Final    BUN/Creatinine Ratio 01/06/2024 11.1  7.0 - 25.0 Final    Anion Gap 01/06/2024 10.4  5.0 - 15.0 mmol/L Final    eGFR 01/06/2024 57.4 (L)  >60.0 mL/min/1.73 Final    HS Troponin T 01/06/2024 <6  <22 ng/L Final    Extra Tube 01/06/2024 Hold for add-ons.   Final    Auto resulted.    Extra Tube 01/06/2024 hold for add-on   Final    Auto resulted    Extra Tube 01/06/2024 Hold for add-ons.   Final    Auto resulted.    Extra Tube 01/06/2024 Hold for add-ons.   Final    Auto resulted    WBC 01/06/2024 11.78 (H)  3.40 - 10.80 10*3/mm3 Final    RBC 01/06/2024 5.06  4.14 - 5.80 10*6/mm3 Final    Hemoglobin 01/06/2024 15.1  13.0 - 17.7 g/dL Final    Hematocrit 01/06/2024 45.5  37.5 - 51.0 % Final    MCV 01/06/2024 89.9  79.0 - 97.0 fL Final    MCH 01/06/2024 29.8  26.6 - 33.0 pg Final    MCHC 01/06/2024 33.2  31.5 - 35.7 g/dL Final    RDW 01/06/2024 13.1  12.3 - 15.4 % Final    RDW-SD 01/06/2024 42.9  37.0 - 54.0 fl Final    MPV 01/06/2024 9.8  6.0 - 12.0 fL Final    Platelets 01/06/2024 252  140 - 450 10*3/mm3 Final    Neutrophil % 01/06/2024 63.9  42.7 - 76.0 % Final    Lymphocyte % 01/06/2024 25.6  19.6 - 45.3 % Final    Monocyte % 01/06/2024 7.9  5.0 - 12.0 % Final    Eosinophil % 01/06/2024 1.9  0.3 - 6.2 % Final    Basophil % 01/06/2024 0.3  0.0 - 1.5 % Final    Immature Grans % 01/06/2024 0.4  0.0 - 0.5 % Final    Neutrophils, Absolute 01/06/2024 7.52 (H)  1.70 - 7.00 10*3/mm3 Final    Lymphocytes, Absolute 01/06/2024 3.02  0.70 - 3.10 10*3/mm3 Final    Monocytes, Absolute 01/06/2024 0.93 (H)  0.10 - 0.90 10*3/mm3 Final    Eosinophils, Absolute 01/06/2024 0.22  0.00 - 0.40 10*3/mm3 Final    Basophils, Absolute  01/06/2024 0.04  0.00 - 0.20 10*3/mm3 Final    Immature Grans, Absolute 01/06/2024 0.05  0.00 - 0.05 10*3/mm3 Final    nRBC 01/06/2024 0.0  0.0 - 0.2 /100 WBC Final       Results review: During today's encounter, all relevant clinical data has been reviewed.      Assessment and Plan  Diagnoses and all orders for this visit:    1. Chronic left shoulder pain (Primary)  -     XR Shoulder 2+ View Left    2. Pain in other joint  -     YELITZA by IFA, Reflex 9-biomarkers profile; Future  -     Rheumatoid Factor; Future    3. Other fatigue  -     TSH Rfx On Abnormal To Free T4; Future  -     Vitamin B12; Future  -     YELITZA by IFA, Reflex 9-biomarkers profile; Future  -     Rheumatoid Factor; Future  -     Home Sleep Study; Future    4. Abnormal glucose  -     Hemoglobin A1c; Future    5. Vitamin D deficiency  -     Vitamin D,25-Hydroxy; Future    6. Hematuria, unspecified type  -     Urinalysis With Culture If Indicated - Urine, Clean Catch; Future  -     PSA Screen; Future    7. Screening for malignant neoplasm of prostate  -     PSA Screen; Future    8. Elevated blood pressure reading  -     CBC Auto Differential; Future  -     Comprehensive Metabolic Panel; Future  -     Lipid Panel; Future    9. Dizziness  -     ECG 12 Lead    10. Shortness of breath  -     Complete PFT - Pre & Post Bronchodilator; Future      1-3) we are ordering shoulder x-ray today as well as autoimmune workup due to excessive fatigue, and chronic joint pain that is generalized.  Included in differential diagnosis for fatigue is obstructive sleep apnea which she has been diagnosed with in the past, but due to technical issues had to quit using the machine and has not followed up.  We are also ordering a repeat sleep study today for further evaluation.  In regard to pain in left chest and shoulder, suspect cervical or shoulder etiology as source.  Previous cardiac workup in ER was normal, and EKG today was also normal.    4-5) performing yearly lab work  today, and lab work to look for sources of symptom etiology.  Will notify patient when results are available.    6-7) does have history of hematuria and would like to go ahead and have his PSA performed.  States was told by urology that he did have a thickened bladder wall and he has not been for his follow-up yet this year.    8-11) elevated blood pressure noted today, patient is going to begin monitoring at home.  Family present with patient and does feel like he stays under a lot of stress and that could be the source of some of his symptoms as well.  He reports he does well through the day but at night sometimes he does feel like he fixates sometimes on a certain issue and it causes him to have trouble sleeping.  He reports he also occasionally has episodes of dizziness that seems to resolve pretty quickly, and shortness of breath.  He is not a smoker, but finds that he often has to prop himself up on several pillows to sleep at night as he feels like he raffaele.  He is agreeable to pulmonary function testing today to rule out any underlying etiology.  In regard to his insomnia, he reports he has tried over-the-counter agents that have not helped.  He has not tried melatonin yet and is going to try that and then we will follow-up at upcoming visit and discuss further.         New Medications:   No orders of the defined types were placed in this encounter.      Discontinued Medications:   Medications Discontinued During This Encounter   Medication Reason    guaiFENesin (Mucinex) 600 MG 12 hr tablet *Therapy completed    Nirmatrelvir&Ritonavir 300/100 (PAXLOVID) 20 x 150 MG & 10 x 100MG tablet therapy pack tablet *Therapy completed    pseudoephedrine (SUDAFED) 60 MG tablet *Therapy completed    acetaminophen (TYLENOL) tablet 1,000 mg           ECG 12 Lead    Date/Time: 5/30/2024 12:58 PM  Performed by: Alma Rosa Carbajal APRN    Authorized by: Alma Rosa Carbajal APRN  Comparison: compared with previous ECG from  1/6/2024  Comparison to previous ECG: Sinus tach- nonspecific t wave abnormality   Rhythm: sinus rhythm  Rate: normal  QRS axis: normal    Clinical impression: normal ECG           Visit Diagnoses:    ICD-10-CM ICD-9-CM   1. Chronic left shoulder pain  M25.512 719.41    G89.29 338.29   2. Pain in other joint  M25.59 719.48   3. Other fatigue  R53.83 780.79   4. Abnormal glucose  R73.09 790.29   5. Vitamin D deficiency  E55.9 268.9   6. Hematuria, unspecified type  R31.9 599.70   7. Screening for malignant neoplasm of prostate  Z12.5 V76.44   8. Elevated blood pressure reading  R03.0 796.2   9. Dizziness  R42 780.4   10. Shortness of breath  R06.02 786.05            Follow Up:   Return for xray today .    Patient was given instructions and counseling regarding his condition or for health maintenance advice. Please see specific information pulled into the AVS if appropriate.       This document has been electronically signed by MAYNOR Banuelos   May 30, 2024 15:18 EDT    Dictated Utilizing Dragon Dictation: Part of this note may be an electronic transcription/translation of spoken language to printed text using the Dragon Dictation System.

## 2024-05-30 ENCOUNTER — LAB (OUTPATIENT)
Dept: FAMILY MEDICINE CLINIC | Facility: CLINIC | Age: 54
End: 2024-05-30
Payer: COMMERCIAL

## 2024-05-30 DIAGNOSIS — M25.59 PAIN IN OTHER JOINT: ICD-10-CM

## 2024-05-30 DIAGNOSIS — R53.83 OTHER FATIGUE: ICD-10-CM

## 2024-05-30 DIAGNOSIS — M25.512 CHRONIC LEFT SHOULDER PAIN: ICD-10-CM

## 2024-05-30 DIAGNOSIS — R73.09 ABNORMAL GLUCOSE: ICD-10-CM

## 2024-05-30 DIAGNOSIS — Z12.5 SCREENING FOR MALIGNANT NEOPLASM OF PROSTATE: ICD-10-CM

## 2024-05-30 DIAGNOSIS — R31.9 HEMATURIA, UNSPECIFIED TYPE: ICD-10-CM

## 2024-05-30 DIAGNOSIS — E55.9 VITAMIN D DEFICIENCY: ICD-10-CM

## 2024-05-30 DIAGNOSIS — M21.822 HILL SACHS DEFORMITY, LEFT: Primary | ICD-10-CM

## 2024-05-30 DIAGNOSIS — G89.29 CHRONIC LEFT SHOULDER PAIN: ICD-10-CM

## 2024-05-30 DIAGNOSIS — R03.0 ELEVATED BLOOD PRESSURE READING: ICD-10-CM

## 2024-05-30 LAB
25(OH)D3 SERPL-MCNC: 29.8 NG/ML (ref 30–100)
ALBUMIN SERPL-MCNC: 4.5 G/DL (ref 3.5–5.2)
ALBUMIN/GLOB SERPL: 1.7 G/DL
ALP SERPL-CCNC: 70 U/L (ref 39–117)
ALT SERPL W P-5'-P-CCNC: 19 U/L (ref 1–41)
ANION GAP SERPL CALCULATED.3IONS-SCNC: 12.2 MMOL/L (ref 5–15)
AST SERPL-CCNC: 20 U/L (ref 1–40)
BASOPHILS # BLD AUTO: 0.03 10*3/MM3 (ref 0–0.2)
BASOPHILS NFR BLD AUTO: 0.3 % (ref 0–1.5)
BILIRUB SERPL-MCNC: 0.6 MG/DL (ref 0–1.2)
BUN SERPL-MCNC: 14 MG/DL (ref 6–20)
BUN/CREAT SERPL: 13.5 (ref 7–25)
CALCIUM SPEC-SCNC: 9.5 MG/DL (ref 8.6–10.5)
CHLORIDE SERPL-SCNC: 105 MMOL/L (ref 98–107)
CHOLEST SERPL-MCNC: 203 MG/DL (ref 0–200)
CHROMATIN AB SERPL-ACNC: <10 IU/ML (ref 0–14)
CO2 SERPL-SCNC: 24.8 MMOL/L (ref 22–29)
CREAT SERPL-MCNC: 1.04 MG/DL (ref 0.76–1.27)
DEPRECATED RDW RBC AUTO: 42.2 FL (ref 37–54)
EGFRCR SERPLBLD CKD-EPI 2021: 84.3 ML/MIN/1.73
EOSINOPHIL # BLD AUTO: 0.14 10*3/MM3 (ref 0–0.4)
EOSINOPHIL NFR BLD AUTO: 1.5 % (ref 0.3–6.2)
ERYTHROCYTE [DISTWIDTH] IN BLOOD BY AUTOMATED COUNT: 12.9 % (ref 12.3–15.4)
GLOBULIN UR ELPH-MCNC: 2.6 GM/DL
GLUCOSE SERPL-MCNC: 98 MG/DL (ref 65–99)
HBA1C MFR BLD: 5.7 % (ref 4.8–5.6)
HCT VFR BLD AUTO: 46.2 % (ref 37.5–51)
HDLC SERPL-MCNC: 42 MG/DL (ref 40–60)
HGB BLD-MCNC: 15.7 G/DL (ref 13–17.7)
IMM GRANULOCYTES # BLD AUTO: 0.02 10*3/MM3 (ref 0–0.05)
IMM GRANULOCYTES NFR BLD AUTO: 0.2 % (ref 0–0.5)
LDLC SERPL CALC-MCNC: 137 MG/DL (ref 0–100)
LDLC/HDLC SERPL: 3.2 {RATIO}
LYMPHOCYTES # BLD AUTO: 2.6 10*3/MM3 (ref 0.7–3.1)
LYMPHOCYTES NFR BLD AUTO: 28.2 % (ref 19.6–45.3)
MCH RBC QN AUTO: 30.6 PG (ref 26.6–33)
MCHC RBC AUTO-ENTMCNC: 34 G/DL (ref 31.5–35.7)
MCV RBC AUTO: 90.1 FL (ref 79–97)
MONOCYTES # BLD AUTO: 0.7 10*3/MM3 (ref 0.1–0.9)
MONOCYTES NFR BLD AUTO: 7.6 % (ref 5–12)
NEUTROPHILS NFR BLD AUTO: 5.72 10*3/MM3 (ref 1.7–7)
NEUTROPHILS NFR BLD AUTO: 62.2 % (ref 42.7–76)
NRBC BLD AUTO-RTO: 0 /100 WBC (ref 0–0.2)
PLATELET # BLD AUTO: 261 10*3/MM3 (ref 140–450)
PMV BLD AUTO: 11 FL (ref 6–12)
POTASSIUM SERPL-SCNC: 4.4 MMOL/L (ref 3.5–5.2)
PROT SERPL-MCNC: 7.1 G/DL (ref 6–8.5)
PSA SERPL-MCNC: 1.09 NG/ML (ref 0–4)
RBC # BLD AUTO: 5.13 10*6/MM3 (ref 4.14–5.8)
SODIUM SERPL-SCNC: 142 MMOL/L (ref 136–145)
TRIGL SERPL-MCNC: 133 MG/DL (ref 0–150)
TSH SERPL DL<=0.05 MIU/L-ACNC: 2.98 UIU/ML (ref 0.27–4.2)
VIT B12 BLD-MCNC: 272 PG/ML (ref 211–946)
VLDLC SERPL-MCNC: 24 MG/DL (ref 5–40)
WBC NRBC COR # BLD AUTO: 9.21 10*3/MM3 (ref 3.4–10.8)

## 2024-05-30 PROCEDURE — 82607 VITAMIN B-12: CPT | Performed by: NURSE PRACTITIONER

## 2024-05-30 PROCEDURE — 84443 ASSAY THYROID STIM HORMONE: CPT | Performed by: NURSE PRACTITIONER

## 2024-05-30 PROCEDURE — 86038 ANTINUCLEAR ANTIBODIES: CPT | Performed by: NURSE PRACTITIONER

## 2024-05-30 PROCEDURE — 86225 DNA ANTIBODY NATIVE: CPT | Performed by: NURSE PRACTITIONER

## 2024-05-30 PROCEDURE — 86235 NUCLEAR ANTIGEN ANTIBODY: CPT | Performed by: NURSE PRACTITIONER

## 2024-05-30 PROCEDURE — 36415 COLL VENOUS BLD VENIPUNCTURE: CPT

## 2024-05-30 PROCEDURE — 81003 URINALYSIS AUTO W/O SCOPE: CPT | Performed by: NURSE PRACTITIONER

## 2024-05-30 PROCEDURE — 86431 RHEUMATOID FACTOR QUANT: CPT | Performed by: NURSE PRACTITIONER

## 2024-05-30 PROCEDURE — 80053 COMPREHEN METABOLIC PANEL: CPT | Performed by: NURSE PRACTITIONER

## 2024-05-30 PROCEDURE — 83036 HEMOGLOBIN GLYCOSYLATED A1C: CPT | Performed by: NURSE PRACTITIONER

## 2024-05-30 PROCEDURE — 93000 ELECTROCARDIOGRAM COMPLETE: CPT | Performed by: NURSE PRACTITIONER

## 2024-05-30 PROCEDURE — 80061 LIPID PANEL: CPT | Performed by: NURSE PRACTITIONER

## 2024-05-30 PROCEDURE — G0103 PSA SCREENING: HCPCS | Performed by: NURSE PRACTITIONER

## 2024-05-30 PROCEDURE — 82306 VITAMIN D 25 HYDROXY: CPT | Performed by: NURSE PRACTITIONER

## 2024-05-30 PROCEDURE — 85025 COMPLETE CBC W/AUTO DIFF WBC: CPT | Performed by: NURSE PRACTITIONER

## 2024-05-31 LAB
BILIRUB UR QL STRIP: NEGATIVE
CLARITY UR: CLEAR
COLOR UR: YELLOW
GLUCOSE UR STRIP-MCNC: NEGATIVE MG/DL
HGB UR QL STRIP.AUTO: NEGATIVE
HOLD SPECIMEN: NORMAL
KETONES UR QL STRIP: NEGATIVE
LEUKOCYTE ESTERASE UR QL STRIP.AUTO: NEGATIVE
NITRITE UR QL STRIP: NEGATIVE
PH UR STRIP.AUTO: 6.5 [PH] (ref 5–8)
PROT UR QL STRIP: NEGATIVE
SP GR UR STRIP: 1.01 (ref 1–1.03)
UROBILINOGEN UR QL STRIP: NORMAL

## 2024-06-03 NOTE — PROGRESS NOTES
At upcoming appt will discuss labs, Vit D was a little low so we will send some in for him (once per week) and redraw in a couple months. ASCVD RISK 9- intermediate, will discuss statin therapy. PreDM @ 5.7.Vit B low normal, will discuss a little further. Labs otherwise unremarkable. Pending [facility]

## 2024-06-04 LAB
ANA HOMOGEN TITR SER: NORMAL {TITER}
ANA SER QL IF: POSITIVE
CENTROMERE B AB SER-ACNC: <0.2 AI (ref 0–0.9)
CHROMATIN AB SERPL-ACNC: <0.2 AI (ref 0–0.9)
DSDNA AB SER-ACNC: <1 IU/ML (ref 0–9)
ENA JO1 AB SER-ACNC: <0.2 AI (ref 0–0.9)
ENA RNP AB SER-ACNC: <0.2 AI (ref 0–0.9)
ENA SCL70 AB SER-ACNC: <0.2 AI (ref 0–0.9)
ENA SM AB SER-ACNC: <0.2 AI (ref 0–0.9)
ENA SS-A AB SER-ACNC: <0.2 AI (ref 0–0.9)
ENA SS-B AB SER-ACNC: <0.2 AI (ref 0–0.9)
LABORATORY COMMENT REPORT: ABNORMAL
Lab: NORMAL
Lab: NORMAL

## 2024-06-07 ENCOUNTER — OFFICE VISIT (OUTPATIENT)
Dept: FAMILY MEDICINE CLINIC | Facility: CLINIC | Age: 54
End: 2024-06-07
Payer: COMMERCIAL

## 2024-06-07 VITALS
HEIGHT: 74 IN | SYSTOLIC BLOOD PRESSURE: 136 MMHG | WEIGHT: 207 LBS | TEMPERATURE: 98.4 F | DIASTOLIC BLOOD PRESSURE: 76 MMHG | HEART RATE: 87 BPM | BODY MASS INDEX: 26.56 KG/M2 | OXYGEN SATURATION: 99 %

## 2024-06-07 DIAGNOSIS — R73.03 PREDIABETES: ICD-10-CM

## 2024-06-07 DIAGNOSIS — R76.8 ANA POSITIVE: ICD-10-CM

## 2024-06-07 DIAGNOSIS — E55.9 VITAMIN D DEFICIENCY: Primary | ICD-10-CM

## 2024-06-07 DIAGNOSIS — E78.2 MIXED HYPERLIPIDEMIA: ICD-10-CM

## 2024-06-07 DIAGNOSIS — G89.29 CHRONIC LEFT SHOULDER PAIN: ICD-10-CM

## 2024-06-07 DIAGNOSIS — E53.9 VITAMIN B DEFICIENCY: ICD-10-CM

## 2024-06-07 DIAGNOSIS — M25.512 CHRONIC LEFT SHOULDER PAIN: ICD-10-CM

## 2024-06-07 PROCEDURE — 99214 OFFICE O/P EST MOD 30 MIN: CPT | Performed by: NURSE PRACTITIONER

## 2024-06-07 RX ORDER — ERGOCALCIFEROL 1.25 MG/1
CAPSULE ORAL
Qty: 4 CAPSULE | Refills: 2 | Status: SHIPPED | OUTPATIENT
Start: 2024-06-07

## 2024-06-07 RX ORDER — CYANOCOBALAMIN 1000 UG/ML
INJECTION, SOLUTION INTRAMUSCULAR; SUBCUTANEOUS
Qty: 1 ML | Refills: 6 | Status: SHIPPED | OUTPATIENT
Start: 2024-06-07

## 2024-06-07 NOTE — PROGRESS NOTES
[facility]     Chief Complaint  Shoulder Pain    Jovany Infante is a 54 y.o. male who presents today to [facility] for Shoulder Pain.    HPI:   HPI   Patient presents today to discuss lab results and shoulder pain.  Reports there is been no change.  Denies any other concerning signs or symptoms at today's visit.  Did discuss dizziness and a little further detail, and patient reports he has noticed some improvement since using the nasal spray.  States at times his attacks of dizziness will last up to 30 minutes, but typically after eating he starts to feel better.  Wife is going to start checking BG during episodes of feeling weak and dizzy and will notify of results.  Patient is considering holding on pulmonary function test at this time but reports may get it performed later in the year.  He is waiting on supplies for for the overnight home sleep study is going to notify us if they do not contact him.    Previous History:   Past Medical History:   Diagnosis Date    Allergic rhinitis     Back pain     BPH (benign prostatic hyperplasia)     GERD (gastroesophageal reflux disease)     Headache     Heart murmur     History of ulcer disease     Hyperlipidemia     Low back pain     Sleep apnea       Past Surgical History:   Procedure Laterality Date    CARPAL TUNNEL RELEASE Right     ENDOSCOPY      SKIN GRAFT      legs, stomach, hands      Social History     Socioeconomic History    Marital status:    Tobacco Use    Smoking status: Never    Smokeless tobacco: Current     Types: Chew    Tobacco comments:     Smokeless tobacco   Vaping Use    Vaping status: Never Used   Substance and Sexual Activity    Alcohol use: Yes     Comment: occasionally    Drug use: No    Sexual activity: Yes     Partners: Female     Birth control/protection: None      Health Maintenance Due   Topic Date Due    ZOSTER VACCINE (1 of 2) Never done    HEPATITIS C SCREENING  Never done    ANNUAL  "PHYSICAL  Never done    COVID-19 Vaccine (1 - 2023-24 season) Never done    BMI FOLLOWUP  01/31/2024        Current Medications:  Current Outpatient Medications   Medication Sig Dispense Refill    cetirizine (zyrTEC) 10 MG tablet Take 1 tablet by mouth Daily. 90 tablet 1    cyanocobalamin 1000 MCG/ML injection Inject 1ml (1000mcg) IM once weekly x 4 weeks; then 1ml once per month 1 mL 6    pantoprazole (PROTONIX) 40 MG EC tablet Take 1 tablet by mouth Daily. 90 tablet 1    vitamin D (ERGOCALCIFEROL) 1.25 MG (04137 UT) capsule capsule Take one tablet by mouth once per week on Mondays 4 capsule 2     No current facility-administered medications for this visit.       Allergies:   Allergies   Allergen Reactions    Morphine Hallucinations    Percocet [Oxycodone-Acetaminophen] Hallucinations       Vitals:   /76 (BP Location: Right arm, Patient Position: Sitting, Cuff Size: Adult)   Pulse 87   Temp 98.4 °F (36.9 °C) (Temporal)   Ht 188 cm (74.02\")   Wt 93.9 kg (207 lb)   SpO2 99%   BMI 26.57 kg/m²   Estimated body mass index is 26.57 kg/m² as calculated from the following:    Height as of this encounter: 188 cm (74.02\").    Weight as of this encounter: 93.9 kg (207 lb).        Physical Exam:   Physical Exam  Vitals reviewed.   Constitutional:       Appearance: Normal appearance.   HENT:      Head: Normocephalic.      Right Ear: Tympanic membrane and ear canal normal.      Left Ear: Tympanic membrane and ear canal normal.      Nose: Nose normal.      Mouth/Throat:      Pharynx: Oropharynx is clear.   Eyes:      Extraocular Movements: Extraocular movements intact.      Conjunctiva/sclera: Conjunctivae normal.   Cardiovascular:      Rate and Rhythm: Normal rate.      Heart sounds: Normal heart sounds.   Pulmonary:      Effort: Pulmonary effort is normal.      Breath sounds: Normal breath sounds.   Abdominal:      General: Bowel sounds are normal.      Palpations: Abdomen is soft.   Skin:     General: Skin is warm " and dry.   Neurological:      Mental Status: He is alert. Mental status is at baseline.      Comments: Normal gait    Psychiatric:         Mood and Affect: Mood normal.          Lab Results:   Lab on 05/30/2024   Component Date Value Ref Range Status    WBC 05/30/2024 9.21  3.40 - 10.80 10*3/mm3 Final    RBC 05/30/2024 5.13  4.14 - 5.80 10*6/mm3 Final    Hemoglobin 05/30/2024 15.7  13.0 - 17.7 g/dL Final    Hematocrit 05/30/2024 46.2  37.5 - 51.0 % Final    MCV 05/30/2024 90.1  79.0 - 97.0 fL Final    MCH 05/30/2024 30.6  26.6 - 33.0 pg Final    MCHC 05/30/2024 34.0  31.5 - 35.7 g/dL Final    RDW 05/30/2024 12.9  12.3 - 15.4 % Final    RDW-SD 05/30/2024 42.2  37.0 - 54.0 fl Final    MPV 05/30/2024 11.0  6.0 - 12.0 fL Final    Platelets 05/30/2024 261  140 - 450 10*3/mm3 Final    Neutrophil % 05/30/2024 62.2  42.7 - 76.0 % Final    Lymphocyte % 05/30/2024 28.2  19.6 - 45.3 % Final    Monocyte % 05/30/2024 7.6  5.0 - 12.0 % Final    Eosinophil % 05/30/2024 1.5  0.3 - 6.2 % Final    Basophil % 05/30/2024 0.3  0.0 - 1.5 % Final    Immature Grans % 05/30/2024 0.2  0.0 - 0.5 % Final    Neutrophils, Absolute 05/30/2024 5.72  1.70 - 7.00 10*3/mm3 Final    Lymphocytes, Absolute 05/30/2024 2.60  0.70 - 3.10 10*3/mm3 Final    Monocytes, Absolute 05/30/2024 0.70  0.10 - 0.90 10*3/mm3 Final    Eosinophils, Absolute 05/30/2024 0.14  0.00 - 0.40 10*3/mm3 Final    Basophils, Absolute 05/30/2024 0.03  0.00 - 0.20 10*3/mm3 Final    Immature Grans, Absolute 05/30/2024 0.02  0.00 - 0.05 10*3/mm3 Final    nRBC 05/30/2024 0.0  0.0 - 0.2 /100 WBC Final    Glucose 05/30/2024 98  65 - 99 mg/dL Final    BUN 05/30/2024 14  6 - 20 mg/dL Final    Creatinine 05/30/2024 1.04  0.76 - 1.27 mg/dL Final    Sodium 05/30/2024 142  136 - 145 mmol/L Final    Potassium 05/30/2024 4.4  3.5 - 5.2 mmol/L Final    Chloride 05/30/2024 105  98 - 107 mmol/L Final    CO2 05/30/2024 24.8  22.0 - 29.0 mmol/L Final    Calcium 05/30/2024 9.5  8.6 - 10.5 mg/dL Final     Total Protein 05/30/2024 7.1  6.0 - 8.5 g/dL Final    Albumin 05/30/2024 4.5  3.5 - 5.2 g/dL Final    ALT (SGPT) 05/30/2024 19  1 - 41 U/L Final    AST (SGOT) 05/30/2024 20  1 - 40 U/L Final    Alkaline Phosphatase 05/30/2024 70  39 - 117 U/L Final    Total Bilirubin 05/30/2024 0.6  0.0 - 1.2 mg/dL Final    Globulin 05/30/2024 2.6  gm/dL Final    A/G Ratio 05/30/2024 1.7  g/dL Final    BUN/Creatinine Ratio 05/30/2024 13.5  7.0 - 25.0 Final    Anion Gap 05/30/2024 12.2  5.0 - 15.0 mmol/L Final    eGFR 05/30/2024 84.3  >60.0 mL/min/1.73 Final    TSH 05/30/2024 2.980  0.270 - 4.200 uIU/mL Final    Hemoglobin A1C 05/30/2024 5.70 (H)  4.80 - 5.60 % Final    Total Cholesterol 05/30/2024 203 (H)  0 - 200 mg/dL Final    Triglycerides 05/30/2024 133  0 - 150 mg/dL Final    HDL Cholesterol 05/30/2024 42  40 - 60 mg/dL Final    LDL Cholesterol  05/30/2024 137 (H)  0 - 100 mg/dL Final    VLDL Cholesterol 05/30/2024 24  5 - 40 mg/dL Final    LDL/HDL Ratio 05/30/2024 3.20   Final    Vitamin B-12 05/30/2024 272  211 - 946 pg/mL Final    25 Hydroxy, Vitamin D 05/30/2024 29.8 (L)  30.0 - 100.0 ng/ml Final    YELITZA 05/30/2024 Positive (A)   Final                                         Negative   <1:80                                       Borderline  1:80                                       Positive   >1:80    Please note 05/30/2024 Comment   Final    YELITZA Multiplex methodology was designed to detect up to 11 antibodies  of the 100+ antibodies that may be detected by YELITZA IFA methodology.    Rheumatoid Factor Quantitative 05/30/2024 <10.0  0.0 - 14.0 IU/mL Final    Color, UA 05/30/2024 Yellow  Yellow, Straw Final    Appearance, UA 05/30/2024 Clear  Clear Final    pH, UA 05/30/2024 6.5  5.0 - 8.0 Final    Specific Gravity, UA 05/30/2024 1.006  1.005 - 1.030 Final    Glucose, UA 05/30/2024 Negative  Negative Final    Ketones, UA 05/30/2024 Negative  Negative Final    Bilirubin, UA 05/30/2024 Negative  Negative Final    Blood, UA  2024 Negative  Negative Final    Protein, UA 2024 Negative  Negative Final    Leuk Esterase, UA 2024 Negative  Negative Final    Nitrite, UA 2024 Negative  Negative Final    Urobilinogen, UA 2024 0.2 E.U./dL  0.2 - 1.0 E.U./dL Final    PSA 2024 1.090  0.000 - 4.000 ng/mL Final    Extra Tube 2024 Hold for add-ons.   Final    Auto resulted.    Homogeneous Pattern 2024 1:80   Final    ICAP nomenclature: AC-1    Note: (Reference) 2024 Comment   Final    Comment: Pattern              Potential Disease Association  -------------  ---------------------------------------------  Homogeneous    Systemic Lupus Erythematosus, Drug Induced                 Systemic Lupus Erythematosus, Chronic                 Autoimmune hepatitis, Juvenile Idiopathic                 Arthritis  -------------  ---------------------------------------------  Speckled       Sjogren Syndrome, Systemic Lupus                 Erythematosus, Subacute Cutaneous Lupus,                  Lupus, Congenital Heart Block,                 Mixed Connective Tissue Disease,                 Scleroderma-diffuse, Scleroderma-Autoimmune                 Myositis Overlap Syndrome, Systemic Lupus                 Whrvweoupjxpf-Rfoqmkyoqft-Ndzewohwla                 Myositis Overlap Syndrome, Systemic                 Autoimmune Rheumatic Disease,                 Undifferentiated Connective Tissue Disease  -------------  ---------------------------------------------  Nucleolar      Systemic                            Sclerosis, Scleroderma-Autoimmune                 Myositis Overlap Syndrome, Sjogren                 Syndrome, Raynaud phenomenon, Pulmonary                 Arterial Hypertension, Systemic Autoimmune                 Rheumatic Disease, Cancer  -------------  ---------------------------------------------  Centromere     Scleroderma-CREST, Limited Cutaneous SSc,                 Raynaud's Phenomenon, Primary  Biliary                 Cholangitis  -------------  ---------------------------------------------  Nuclear Dot    Primary Biliary Cholangitis  -------------  ---------------------------------------------  Nuclear        Primary Biliary Cholangitis, Autoimmune  Membrane       Hepatitis/Liver disease, Systemic Autoimmune                 Rheumatic Disease, Autoimmune Cytopenias,                 Linear Scleroderma, Antiphospholipid Syndrome  -------------  ---------------------------------------------    Anti-DNA (DS) Ab Qn 05/30/2024 <1  0 - 9 IU/mL Final                                       Negative      <5                                     Equivocal  5 - 9                                     Positive      >9    RNP Antibodies 05/30/2024 <0.2  0.0 - 0.9 AI Final    Mancia Antibodies 05/30/2024 <0.2  0.0 - 0.9 AI Final    Antiscleroderma-70 Antibodies 05/30/2024 <0.2  0.0 - 0.9 AI Final    Sjogren's Anti-SS-A 05/30/2024 <0.2  0.0 - 0.9 AI Final    Sjogren's Anti-SS-B 05/30/2024 <0.2  0.0 - 0.9 AI Final    Antichromatin Antibodies 05/30/2024 <0.2  0.0 - 0.9 AI Final    BOBBI-1 IgG 05/30/2024 <0.2  0.0 - 0.9 AI Final    Anti-Centromere B Antibodies 05/30/2024 <0.2  0.0 - 0.9 AI Final    See below: 05/30/2024 Comment   Final    Comment: Autoantibody                       Disease Association  ------------------------------------------------------------                          Condition                  Frequency  ---------------------   ------------------------   ---------  Antinuclear Antibody,    SLE, mixed connective  Direct (YELITZA-D)           tissue diseases  ---------------------   ------------------------   ---------  dsDNA                    SLE                        40 - 60%  ---------------------   ------------------------   ---------  Chromatin                Drug induced SLE                90%                           SLE                        48 - 97%  ---------------------   ------------------------    ---------  SSA (Ro)                 SLE                        25 - 35%                           Sjogren's Syndrome         40 - 70%                            Lupus                 100%  ---------------------   ------------------------   ---------  SSB (La)                 SLE                                                        10%                           Sjogren's Syndrome              30%  ---------------------   -----------------------    ---------  Sm (anti-Smith)          SLE                        15 - 30%  ---------------------   -----------------------    ---------  RNP                      Mixed Connective Tissue                           Disease                         95%  (U1 nRNP,                SLE                        30 - 50%  anti-ribonucleoprotein)  Polymyositis and/or                           Dermatomyositis                 20%  ---------------------   ------------------------   ---------  Scl-70 (antiDNA          Scleroderma (diffuse)      20 - 35%  topoisomerase)           Crest                           13%  ---------------------   ------------------------   ---------  Kat-1                     Polymyositis and/or                           Dermatomyositis            20 - 40%  ---------------------   ------------------------   ---------  Centromere B             Scleroderma -                            Crest                           variant                         80%       Results review: During today's encounter, all relevant clinical data has been reviewed.      Assessment and Plan  Diagnoses and all orders for this visit:    1. Vitamin D deficiency (Primary)    2. Vitamin B deficiency    3. Mixed hyperlipidemia    4. Prediabetes    5. Chronic left shoulder pain    6. YELITZA positive    Other orders  -     vitamin D (ERGOCALCIFEROL) 1.25 MG (99047 UT) capsule capsule; Take one tablet by mouth once per week on   Dispense: 4 capsule; Refill: 2  -     cyanocobalamin 1000  MCG/ML injection; Inject 1ml (1000mcg) IM once weekly x 4 weeks; then 1ml once per month  Dispense: 1 mL; Refill: 6        1-2) patient complains of numbness in hands at times, fatigue especially with exertion and generalized weakness which cannot be associated with vitamin B deficiency.  Unsure if he was told it was low in the past, however it is very low normal today, less than 300 so we will go ahead and treat to see if his symptoms improve.  He prefers to do injection therapy rather than oral, so we will start him on that.  His vitamin D was also slightly low so we will send in supplementation for that as well and repeat his labs in 3 months.  3.)  Discuss cholesterol levels and patient prefers not to start therapy in regard to statins today, reports they have a diet plan and are going to increase exercise in an attempt to lower the levels.  We will recheck again at upcoming appointment and if levels remain high we will discuss possible statin therapy again due to ASCVD risk evaluation.  Also registered in prediabetic category, and is going to work on his diet to try to prevent that from worsening as well.  4.)  Patient referral has been made, will be scheduled with Dr. Borden for further evaluation.  5.)  YELITZA positive but reflex panel inconclusive to specific results, does not want referral to rheumatology at this time but agreeable to repeat in future.       New Medications:   New Medications Ordered This Visit   Medications    vitamin D (ERGOCALCIFEROL) 1.25 MG (52623 UT) capsule capsule     Sig: Take one tablet by mouth once per week on Mondays     Dispense:  4 capsule     Refill:  2    cyanocobalamin 1000 MCG/ML injection     Sig: Inject 1ml (1000mcg) IM once weekly x 4 weeks; then 1ml once per month     Dispense:  1 mL     Refill:  6       Discontinued Medications:   There are no discontinued medications.           Visit Diagnoses:    ICD-10-CM ICD-9-CM   1. Vitamin D deficiency  E55.9 268.9   2. Vitamin  B deficiency  E53.9 266.9   3. Mixed hyperlipidemia  E78.2 272.2   4. Prediabetes  R73.03 790.29   5. Chronic left shoulder pain  M25.512 719.41    G89.29 338.29   6. YELITZA positive  R76.8 795.79            Follow Up:   No follow-ups on file.    Patient was given instructions and counseling regarding his condition or for health maintenance advice. Please see specific information pulled into the AVS if appropriate.       This document has been electronically signed by MAYNOR Banuelos   June 7, 2024 13:33 EDT    Dictated Utilizing Dragon Dictation: Part of this note may be an electronic transcription/translation of spoken language to printed text using the Dragon Dictation System.